# Patient Record
Sex: MALE | Race: WHITE | Employment: UNEMPLOYED | URBAN - NONMETROPOLITAN AREA
[De-identification: names, ages, dates, MRNs, and addresses within clinical notes are randomized per-mention and may not be internally consistent; named-entity substitution may affect disease eponyms.]

---

## 2019-04-02 NOTE — H&P
[address] [place] [address]                       PREOPERATIVE HISTORY AND PHYSICAL    PATIENT NAME: Beatris Telles                       :        1950  MED REC NO:   [id number]                           ROOM:  ACCOUNT NO:   [de-identified]                           ADMIT DATE: 04/10/2019  PROVIDER:     Stacey Hudson P.A-C. Manju Roberts PA-C, dictating for Dr. Rosy Disla. CHIEF COMPLAINT:  Progressive worsening right knee pain. HISTORY OF PRESENT ILLNESS:  The patient is a pleasant 72-year-old male  presents for ongoing history of chronic right knee pain. The patient  has actually been known to have this right knee pain that has been  progressively worsening since the . He has had a right knee  arthroscopy back in  and then noticed increasing pain since then. He  states that the pain is more so in the inside of this knee. He has had  multiple cortical steroid injections, tried nonsteroidal  anti-inflammatories, tried modifying his activities. Unfortunately, he  is still having pain with minimal release from conservative treatment. Due to failure of conservative treatment and degenerative changes noted  within the right knee, he is elected to proceed with right total knee  arthroplasty with a plan to do on 04/10/2019 at [address] [place] [address] by Dr. Manuelito Jaimes. Risks and benefits of infection, stiffness of the  joint, failure of the procedure, failure of the components as well as  risk of anesthesia and blood clots have been discussed. The patient  understands this. The patient will need preoperative clearance. He  will need CBC, BMP, MRSA PCR, chest x-ray, EKG, and urinalysis. At this  stage, the patient would like to proceed understanding these protocols. PAST MEDICAL HISTORY:  Osteoarthritis, hypertension, and  hypercholesterolemia.     CURRENT MEDICATIONS:  Amitriptyline, atorvastatin, lisinopril. ALLERGIES:  No known drug allergies. PAST SURGICAL HISTORY:  Hernia repair, right rotator cuff repair, lumbar  surgery, L2 through S1 laminectomy. SOCIAL HISTORY:  The patient does not smoke. The patient does not  drink. He lives at home with his spouse. REVIEW OF SYSTEMS:  Noncontributory including no fatigue, no nausea,  vomiting, no fevers, chills, shortness of breath, or chest pain. Does  report joint pain, joint swelling, arthralgias, and myalgias. FAMILY HISTORY:  Noncontributory. PHYSICAL EXAMINATION:  GENERAL:  Shows a well-developed, well-nourished white male in no acute  distress. Looks his stated age. Mood and affect appropriate. Alert  and oriented x3. HEENT:  Normocephalic and atraumatic. EOMs intact. No oropharyngitis  noted. HEART:  Regular rate and rhythm. S1, S2 intact. No rubs or gallops  noted. LUNGS:  Clear to auscultation. No audible wheezing. ABDOMEN:  Soft and nontender to palpation. EXTREMITIES:  Right lower extremity:  In regards to the right knee, skin  is intact. No rashes or lesions noted. He does have mild swelling. May be a slight effusion noted today. However, fairly good range of  motion of the right knee 0 to 115 degrees. He does have a grade 1  medial laxity upon varus and valgus stress testing. No anterior laxity  noted. He does have some slight decreased range of motion of the right  hip with external and internal rotation with some mild tenderness. Most  of the time seemed to be localized to the right knee. Tenderness upon  palpation to the medial joint line. No lateral joint line tenderness. He has good range of motion of ankle and toes. Calves are soft and  nontender to palpation. Neurovascularly intact. No numbness or  tingling. He has good dorsalis pedis and posterior tibialis pulses.     X-RAY IMAGING:  Prior x-rays AP and PA both knees standing, lateral, and  sunrise of the left knee shows the patient has significant  osteoarthritis within the medial compartment of right knee, essentially  bone-on-bone arthritis. Does have some dishing in the proximal tibia  noted as well. Mild spurring and decreased joint space in terms of the  patellofemoral joint. Good overall alignment with no subluxation or  dislocation noted. IMPRESSION:  Primary osteoarthritis, right knee, M17.11. PLAN:  At this stage, we are going to proceed with right total knee  arthroplasty to be done on 04/10/2019 at [facility] by  Dr. Nathaly Everett. The patient has then cleared by his primary care provider  to go ahead and proceed with surgery from medical standpoint. He has  had a CBC, BMP, MRSA PCR, chest x-ray, EKG, and urinalysis, all within  normal limits. Negative MRSA PCR screening. Therefore, the patient was  placed on Keflex for antibiotic prophylaxis. We will give Percocet for  pain management. We will most likely place him on aspirin protocol for  DVT prophylaxis. The patient will continue physical therapy on an  outpatient basis. Most likely (going home) after surgery. He will  follow up in the office two weeks after surgery for repeat evaluation,  x-ray imaging, and for wound recheck. At this stage, he understands the  preop and postop protocols, risks and benefits. The patient desired to  proceed with the above-mentioned procedure. Dr. Ole Foster has seen and evaluated the patient, agrees with the  above-noted findings and the above-noted procedure.         Felicitas Almeida P.A-C.    D: 04/01/2019 10:58:49       T: 04/01/2019 21:09:29     [email or web address]  Job#: [id number]     Doc#: [id number]    CC:

## 2019-04-03 NOTE — PROGRESS NOTES
Reminder fax sent to Dr. Mixon Rounds office to send consent, orders, CXR, EKG, LAB results and clearance for surgery on 4/10/19 as soon as you have them. Thank you.

## 2019-04-04 RX ORDER — GABAPENTIN 100 MG/1
100 CAPSULE ORAL 2 TIMES DAILY
COMMUNITY

## 2019-04-04 RX ORDER — ATORVASTATIN CALCIUM 20 MG/1
20 TABLET, FILM COATED ORAL DAILY
COMMUNITY

## 2019-04-04 NOTE — PROGRESS NOTES
NPO after midnight  Bring insurance info and id  Wear comfortable clean clothing  Do not bring jewelry   Shower night before and morning of surgery with a liquid antibacterial soap  Bring medications in original bottles  Follow all instructions give by your physician   needed at discharge  Call [phone] for any questions

## 2019-04-04 NOTE — PROGRESS NOTES
In preparation for their surgical procedure above patient was screened for Obstructive Sleep Apnea (ANDREY) using the STOP-Bang Questionnaire by the Pre-Admission Testing department. This is a pre-surgical screening tool for patient safety and serves as a recommendation, this WILL NOT cause cancellation of surgery. STOP-Bang Questionnaire  * Do you currently see a pulmonologist?  No     If yes STOP, do not complete. Patient follows with  .    1. Do you snore loudly (able to be heard in the next room)? No    2. Do you often feel tired or sleepy during the daytime? No       3. Has anyone ever told you that you stop breathing during your sleep? No    4. Do you have or are you being treated for high blood pressure? Yes      5. BMI more than 35? BMI (Calculated): 27.5        No    6. Age over 48 years? 71 y.o. Yes    7. Neck Circumference greater than 17 inches for male or 16 inches for female? Measured           (visits only)            Not Applicable    8. Gender Male? Yes      TOTAL SCORE: 3    ANDREY - Low Risk : Yes to 0 - 2 questions  ANDREY - Intermediate Risk : Yes to 3 - 4 questions  ANDREY - High Risk : Yes to 5 - 8 questions    Adapted from:   STOP Questionnaire: A Tool to Screen Patients for Obstructive Sleep Apnea   DENNIS FariaC., Redd Craig M.B.B.S., Chiquita Cabello M.D., [facility]. Ronnie Pham, Ph.D., SUE Byrd.B.B.S., Kristin Garcia M.Sc., Isabella Merlos M.D., [facility]. Corinne Botts, F.R.C.P.C.    Anesthesiology 2008; [id number]:[id number] Copyright 2008, the [address] of [employer]est[employer], [facility].   ----------------------------------------------------------------------------------------------------------------

## 2019-04-08 NOTE — PROGRESS NOTES
Left telephone message for Bhaskar Cantu in medical records looking for pre op testing results for surgery wed 4/10/19. Please send what you have,  thank You.

## 2019-04-10 ENCOUNTER — ANESTHESIA EVENT (OUTPATIENT)
Dept: OPERATING ROOM | Age: 69
DRG: 469 | End: 2019-04-10

## 2019-04-10 ENCOUNTER — HOSPITAL ENCOUNTER (INPATIENT)
Age: 69
LOS: 2 days | Discharge: HOME | DRG: 469 | End: 2019-04-12
Attending: ORTHOPAEDIC SURGERY | Admitting: ORTHOPAEDIC SURGERY

## 2019-04-10 ENCOUNTER — ANESTHESIA (OUTPATIENT)
Dept: OPERATING ROOM | Age: 69
DRG: 469 | End: 2019-04-10

## 2019-04-10 ENCOUNTER — APPOINTMENT (OUTPATIENT)
Dept: GENERAL RADIOLOGY | Age: 69
DRG: 469 | End: 2019-04-10
Attending: ORTHOPAEDIC SURGERY

## 2019-04-10 VITALS
SYSTOLIC BLOOD PRESSURE: 98 MMHG | OXYGEN SATURATION: 87 % | RESPIRATION RATE: 1 BRPM | DIASTOLIC BLOOD PRESSURE: 63 MMHG

## 2019-04-10 DIAGNOSIS — M17.11 PRIMARY OSTEOARTHRITIS OF RIGHT KNEE: Primary | ICD-10-CM

## 2019-04-10 LAB
ABO: NORMAL
ANTIBODY SCREEN: NORMAL
RH FACTOR: NORMAL

## 2019-04-10 PROCEDURE — 6370000000 HC RX 637 (ALT 250 FOR IP): Performed by: ANESTHESIOLOGY

## 2019-04-10 PROCEDURE — C1776 JOINT DEVICE (IMPLANTABLE): HCPCS | Performed by: ORTHOPAEDIC SURGERY

## 2019-04-10 PROCEDURE — 3600000015 HC SURGERY LEVEL 5 ADDTL 15MIN: Performed by: ORTHOPAEDIC SURGERY

## 2019-04-10 PROCEDURE — 6360000002 HC RX W HCPCS: Performed by: NURSE ANESTHETIST, CERTIFIED REGISTERED

## 2019-04-10 PROCEDURE — 6360000002 HC RX W HCPCS: Performed by: ORTHOPAEDIC SURGERY

## 2019-04-10 PROCEDURE — 97110 THERAPEUTIC EXERCISES: CPT

## 2019-04-10 PROCEDURE — 6370000000 HC RX 637 (ALT 250 FOR IP): Performed by: ORTHOPAEDIC SURGERY

## 2019-04-10 PROCEDURE — 3600000005 HC SURGERY LEVEL 5 BASE: Performed by: ORTHOPAEDIC SURGERY

## 2019-04-10 PROCEDURE — 2709999900 HC NON-CHARGEABLE SUPPLY: Performed by: ORTHOPAEDIC SURGERY

## 2019-04-10 PROCEDURE — 73560 X-RAY EXAM OF KNEE 1 OR 2: CPT

## 2019-04-10 PROCEDURE — 2580000003 HC RX 258: Performed by: ORTHOPAEDIC SURGERY

## 2019-04-10 PROCEDURE — 86900 BLOOD TYPING SEROLOGIC ABO: CPT

## 2019-04-10 PROCEDURE — 86850 RBC ANTIBODY SCREEN: CPT

## 2019-04-10 PROCEDURE — 2580000003 HC RX 258: Performed by: PHYSICIAN ASSISTANT

## 2019-04-10 PROCEDURE — 3700000001 HC ADD 15 MINUTES (ANESTHESIA): Performed by: ORTHOPAEDIC SURGERY

## 2019-04-10 PROCEDURE — 3700000000 HC ANESTHESIA ATTENDED CARE: Performed by: ORTHOPAEDIC SURGERY

## 2019-04-10 PROCEDURE — 51798 US URINE CAPACITY MEASURE: CPT

## 2019-04-10 PROCEDURE — 0SRC069 REPLACEMENT OF RIGHT KNEE JOINT WITH OXIDIZED ZIRCONIUM ON POLYETHYLENE SYNTHETIC SUBSTITUTE, CEMENTED, OPEN APPROACH: ICD-10-PCS | Performed by: ORTHOPAEDIC SURGERY

## 2019-04-10 PROCEDURE — L1830 KO IMMOB CANVAS LONG PRE OTS: HCPCS | Performed by: ORTHOPAEDIC SURGERY

## 2019-04-10 PROCEDURE — 94640 AIRWAY INHALATION TREATMENT: CPT

## 2019-04-10 PROCEDURE — 86901 BLOOD TYPING SEROLOGIC RH(D): CPT

## 2019-04-10 PROCEDURE — 94761 N-INVAS EAR/PLS OXIMETRY MLT: CPT

## 2019-04-10 PROCEDURE — 7100000000 HC PACU RECOVERY - FIRST 15 MIN: Performed by: ORTHOPAEDIC SURGERY

## 2019-04-10 PROCEDURE — 6370000000 HC RX 637 (ALT 250 FOR IP): Performed by: PHYSICIAN ASSISTANT

## 2019-04-10 PROCEDURE — 94760 N-INVAS EAR/PLS OXIMETRY 1: CPT

## 2019-04-10 PROCEDURE — C1713 ANCHOR/SCREW BN/BN,TIS/BN: HCPCS | Performed by: ORTHOPAEDIC SURGERY

## 2019-04-10 PROCEDURE — 7100000001 HC PACU RECOVERY - ADDTL 15 MIN: Performed by: ORTHOPAEDIC SURGERY

## 2019-04-10 PROCEDURE — 2500000003 HC RX 250 WO HCPCS: Performed by: NURSE ANESTHETIST, CERTIFIED REGISTERED

## 2019-04-10 PROCEDURE — 2500000003 HC RX 250 WO HCPCS: Performed by: ORTHOPAEDIC SURGERY

## 2019-04-10 PROCEDURE — 97162 PT EVAL MOD COMPLEX 30 MIN: CPT

## 2019-04-10 PROCEDURE — 2720000010 HC SURG SUPPLY STERILE: Performed by: ORTHOPAEDIC SURGERY

## 2019-04-10 PROCEDURE — 1200000000 HC SEMI PRIVATE

## 2019-04-10 PROCEDURE — 6360000002 HC RX W HCPCS: Performed by: PHYSICIAN ASSISTANT

## 2019-04-10 PROCEDURE — 36415 COLL VENOUS BLD VENIPUNCTURE: CPT

## 2019-04-10 DEVICE — GENESIS II NON-POROUS TIBIAL                                    BASEPLATE SIZE 6 RIGHT
Type: IMPLANTABLE DEVICE | Site: KNEE | Status: FUNCTIONAL
Brand: GENESIS II

## 2019-04-10 DEVICE — LEGION POSTERIOR STABILIZED HIGH                                    FLEX HIGHLY CROSS LINKED                                    POLYETHYLENE SIZE 5-6 15MM
Type: IMPLANTABLE DEVICE | Site: KNEE | Status: FUNCTIONAL
Brand: LEGION

## 2019-04-10 DEVICE — VERSABOND 40 GRAMS FORMULATION 2
Type: IMPLANTABLE DEVICE | Site: KNEE | Status: FUNCTIONAL
Brand: VERSABOND

## 2019-04-10 DEVICE — GENESIS II BICONVEX PATELLA 32MM
Type: IMPLANTABLE DEVICE | Site: KNEE | Status: FUNCTIONAL
Brand: GENESIS II

## 2019-04-10 DEVICE — GENESIS II OXINIUM POSTERIOR                                    STABILIZED FEMORAL RIGHT SIZE 6
Type: IMPLANTABLE DEVICE | Site: KNEE | Status: FUNCTIONAL
Brand: GENESIS II

## 2019-04-10 RX ORDER — OXYCODONE HYDROCHLORIDE AND ACETAMINOPHEN 5; 325 MG/1; MG/1
2 TABLET ORAL EVERY 4 HOURS PRN
Status: DISCONTINUED | OUTPATIENT
Start: 2019-04-11 | End: 2019-04-12 | Stop reason: HOSPADM

## 2019-04-10 RX ORDER — ONDANSETRON 2 MG/ML
4 INJECTION INTRAMUSCULAR; INTRAVENOUS
Status: DISCONTINUED | OUTPATIENT
Start: 2019-04-10 | End: 2019-04-10 | Stop reason: HOSPADM

## 2019-04-10 RX ORDER — LISINOPRIL 20 MG/1
20 TABLET ORAL DAILY
Status: DISCONTINUED | OUTPATIENT
Start: 2019-04-11 | End: 2019-04-12 | Stop reason: HOSPADM

## 2019-04-10 RX ORDER — DIPHENHYDRAMINE HYDROCHLORIDE 50 MG/ML
12.5 INJECTION INTRAMUSCULAR; INTRAVENOUS
Status: DISCONTINUED | OUTPATIENT
Start: 2019-04-10 | End: 2019-04-10 | Stop reason: HOSPADM

## 2019-04-10 RX ORDER — GABAPENTIN 100 MG/1
100 CAPSULE ORAL 2 TIMES DAILY
Status: DISCONTINUED | OUTPATIENT
Start: 2019-04-10 | End: 2019-04-12 | Stop reason: HOSPADM

## 2019-04-10 RX ORDER — HYDROCODONE BITARTRATE AND ACETAMINOPHEN 5; 325 MG/1; MG/1
2 TABLET ORAL EVERY 4 HOURS PRN
Status: DISPENSED | OUTPATIENT
Start: 2019-04-10 | End: 2019-04-11

## 2019-04-10 RX ORDER — NALOXONE HYDROCHLORIDE 0.4 MG/ML
0.4 INJECTION, SOLUTION INTRAMUSCULAR; INTRAVENOUS; SUBCUTANEOUS PRN
Status: DISCONTINUED | OUTPATIENT
Start: 2019-04-10 | End: 2019-04-10 | Stop reason: HOSPADM

## 2019-04-10 RX ORDER — ACETAMINOPHEN 325 MG/1
325 TABLET ORAL EVERY 4 HOURS PRN
Status: DISCONTINUED | OUTPATIENT
Start: 2019-04-10 | End: 2019-04-10 | Stop reason: HOSPADM

## 2019-04-10 RX ORDER — TRANEXAMIC ACID 100 MG/ML
INJECTION, SOLUTION INTRAVENOUS PRN
Status: DISCONTINUED | OUTPATIENT
Start: 2019-04-10 | End: 2019-04-10 | Stop reason: SDUPTHER

## 2019-04-10 RX ORDER — DIPHENHYDRAMINE HYDROCHLORIDE 50 MG/ML
25 INJECTION INTRAMUSCULAR; INTRAVENOUS EVERY 6 HOURS PRN
Status: DISCONTINUED | OUTPATIENT
Start: 2019-04-10 | End: 2019-04-10 | Stop reason: HOSPADM

## 2019-04-10 RX ORDER — SODIUM CHLORIDE 9 MG/ML
INJECTION, SOLUTION INTRAVENOUS CONTINUOUS
Status: DISCONTINUED | OUTPATIENT
Start: 2019-04-10 | End: 2019-04-11

## 2019-04-10 RX ORDER — DOCUSATE SODIUM 100 MG/1
100 CAPSULE, LIQUID FILLED ORAL 2 TIMES DAILY
Status: DISCONTINUED | OUTPATIENT
Start: 2019-04-10 | End: 2019-04-12 | Stop reason: HOSPADM

## 2019-04-10 RX ORDER — HYDROCODONE BITARTRATE AND ACETAMINOPHEN 5; 325 MG/1; MG/1
1 TABLET ORAL EVERY 4 HOURS PRN
Status: DISCONTINUED | OUTPATIENT
Start: 2019-04-10 | End: 2019-04-10 | Stop reason: HOSPADM

## 2019-04-10 RX ORDER — HYDRALAZINE HYDROCHLORIDE 20 MG/ML
5 INJECTION INTRAMUSCULAR; INTRAVENOUS EVERY 10 MIN PRN
Status: DISCONTINUED | OUTPATIENT
Start: 2019-04-10 | End: 2019-04-10 | Stop reason: HOSPADM

## 2019-04-10 RX ORDER — IPRATROPIUM BROMIDE AND ALBUTEROL SULFATE 2.5; .5 MG/3ML; MG/3ML
1 SOLUTION RESPIRATORY (INHALATION) ONCE
Status: COMPLETED | OUTPATIENT
Start: 2019-04-10 | End: 2019-04-10

## 2019-04-10 RX ORDER — ACETAMINOPHEN 325 MG/1
650 TABLET ORAL EVERY 4 HOURS PRN
Status: DISCONTINUED | OUTPATIENT
Start: 2019-04-10 | End: 2019-04-12 | Stop reason: HOSPADM

## 2019-04-10 RX ORDER — ASPIRIN 325 MG
325 TABLET, DELAYED RELEASE (ENTERIC COATED) ORAL 2 TIMES DAILY
Qty: 56 TABLET | Refills: 0 | Status: SHIPPED | OUTPATIENT
Start: 2019-04-10 | End: 2019-06-18

## 2019-04-10 RX ORDER — PROPOFOL 10 MG/ML
INJECTION, EMULSION INTRAVENOUS CONTINUOUS PRN
Status: DISCONTINUED | OUTPATIENT
Start: 2019-04-10 | End: 2019-04-10 | Stop reason: SDUPTHER

## 2019-04-10 RX ORDER — TOBRAMYCIN 1.2 G/30ML
INJECTION, POWDER, LYOPHILIZED, FOR SOLUTION INTRAVENOUS PRN
Status: DISCONTINUED | OUTPATIENT
Start: 2019-04-10 | End: 2019-04-10 | Stop reason: ALTCHOICE

## 2019-04-10 RX ORDER — OXYCODONE HYDROCHLORIDE AND ACETAMINOPHEN 5; 325 MG/1; MG/1
1 TABLET ORAL EVERY 4 HOURS PRN
Status: DISCONTINUED | OUTPATIENT
Start: 2019-04-10 | End: 2019-04-10 | Stop reason: SDUPTHER

## 2019-04-10 RX ORDER — SODIUM CHLORIDE 0.9 % (FLUSH) 0.9 %
10 SYRINGE (ML) INJECTION PRN
Status: DISCONTINUED | OUTPATIENT
Start: 2019-04-10 | End: 2019-04-12 | Stop reason: HOSPADM

## 2019-04-10 RX ORDER — MORPHINE SULFATE 2 MG/ML
2 INJECTION, SOLUTION INTRAMUSCULAR; INTRAVENOUS
Status: DISCONTINUED | OUTPATIENT
Start: 2019-04-10 | End: 2019-04-12 | Stop reason: HOSPADM

## 2019-04-10 RX ORDER — NICOTINE 21 MG/24HR
1 PATCH, TRANSDERMAL 24 HOURS TRANSDERMAL EVERY 24 HOURS
COMMUNITY
End: 2019-06-18

## 2019-04-10 RX ORDER — ATORVASTATIN CALCIUM 20 MG/1
20 TABLET, FILM COATED ORAL DAILY
Status: DISCONTINUED | OUTPATIENT
Start: 2019-04-10 | End: 2019-04-12 | Stop reason: HOSPADM

## 2019-04-10 RX ORDER — ONDANSETRON 2 MG/ML
4 INJECTION INTRAMUSCULAR; INTRAVENOUS EVERY 6 HOURS PRN
Status: DISCONTINUED | OUTPATIENT
Start: 2019-04-10 | End: 2019-04-12 | Stop reason: HOSPADM

## 2019-04-10 RX ORDER — HYDROCODONE BITARTRATE AND ACETAMINOPHEN 5; 325 MG/1; MG/1
1 TABLET ORAL EVERY 4 HOURS PRN
Status: ACTIVE | OUTPATIENT
Start: 2019-04-10 | End: 2019-04-11

## 2019-04-10 RX ORDER — MEPERIDINE HYDROCHLORIDE 25 MG/ML
12.5 INJECTION INTRAMUSCULAR; INTRAVENOUS; SUBCUTANEOUS EVERY 5 MIN PRN
Status: DISCONTINUED | OUTPATIENT
Start: 2019-04-10 | End: 2019-04-10 | Stop reason: HOSPADM

## 2019-04-10 RX ORDER — MORPHINE SULFATE 4 MG/ML
4 INJECTION, SOLUTION INTRAMUSCULAR; INTRAVENOUS
Status: DISCONTINUED | OUTPATIENT
Start: 2019-04-10 | End: 2019-04-12 | Stop reason: HOSPADM

## 2019-04-10 RX ORDER — OXYCODONE HYDROCHLORIDE AND ACETAMINOPHEN 5; 325 MG/1; MG/1
1 TABLET ORAL EVERY 6 HOURS PRN
Qty: 28 TABLET | Refills: 0 | Status: SHIPPED | OUTPATIENT
Start: 2019-04-10 | End: 2019-04-17

## 2019-04-10 RX ORDER — OXYCODONE HYDROCHLORIDE AND ACETAMINOPHEN 5; 325 MG/1; MG/1
1 TABLET ORAL EVERY 4 HOURS PRN
Status: DISCONTINUED | OUTPATIENT
Start: 2019-04-11 | End: 2019-04-12 | Stop reason: HOSPADM

## 2019-04-10 RX ORDER — OXYCODONE HYDROCHLORIDE AND ACETAMINOPHEN 5; 325 MG/1; MG/1
2 TABLET ORAL EVERY 4 HOURS PRN
Status: DISCONTINUED | OUTPATIENT
Start: 2019-04-10 | End: 2019-04-10 | Stop reason: SDUPTHER

## 2019-04-10 RX ORDER — FENTANYL CITRATE 50 UG/ML
INJECTION, SOLUTION INTRAMUSCULAR; INTRAVENOUS PRN
Status: DISCONTINUED | OUTPATIENT
Start: 2019-04-10 | End: 2019-04-10 | Stop reason: SDUPTHER

## 2019-04-10 RX ORDER — NICOTINE 21 MG/24HR
1 PATCH, TRANSDERMAL 24 HOURS TRANSDERMAL EVERY 24 HOURS
Status: DISCONTINUED | OUTPATIENT
Start: 2019-04-10 | End: 2019-04-12 | Stop reason: HOSPADM

## 2019-04-10 RX ORDER — FENTANYL CITRATE 50 UG/ML
50 INJECTION, SOLUTION INTRAMUSCULAR; INTRAVENOUS EVERY 5 MIN PRN
Status: DISCONTINUED | OUTPATIENT
Start: 2019-04-10 | End: 2019-04-10 | Stop reason: HOSPADM

## 2019-04-10 RX ORDER — LABETALOL HYDROCHLORIDE 5 MG/ML
5 INJECTION, SOLUTION INTRAVENOUS EVERY 5 MIN PRN
Status: DISCONTINUED | OUTPATIENT
Start: 2019-04-10 | End: 2019-04-10 | Stop reason: HOSPADM

## 2019-04-10 RX ORDER — SODIUM CHLORIDE 0.9 % (FLUSH) 0.9 %
10 SYRINGE (ML) INJECTION EVERY 12 HOURS SCHEDULED
Status: DISCONTINUED | OUTPATIENT
Start: 2019-04-10 | End: 2019-04-12 | Stop reason: HOSPADM

## 2019-04-10 RX ORDER — HYDROCODONE BITARTRATE AND ACETAMINOPHEN 5; 325 MG/1; MG/1
2 TABLET ORAL EVERY 4 HOURS PRN
Status: DISCONTINUED | OUTPATIENT
Start: 2019-04-10 | End: 2019-04-10 | Stop reason: HOSPADM

## 2019-04-10 RX ORDER — TRANEXAMIC ACID 100 MG/ML
INJECTION, SOLUTION INTRAVENOUS PRN
Status: DISCONTINUED | OUTPATIENT
Start: 2019-04-10 | End: 2019-04-10 | Stop reason: ALTCHOICE

## 2019-04-10 RX ORDER — MORPHINE SULFATE 2 MG/ML
2 INJECTION, SOLUTION INTRAMUSCULAR; INTRAVENOUS EVERY 5 MIN PRN
Status: DISCONTINUED | OUTPATIENT
Start: 2019-04-10 | End: 2019-04-10 | Stop reason: HOSPADM

## 2019-04-10 RX ORDER — CEPHALEXIN 500 MG/1
500 CAPSULE ORAL 2 TIMES DAILY
Qty: 14 CAPSULE | Refills: 0 | Status: SHIPPED | OUTPATIENT
Start: 2019-04-10 | End: 2019-04-17

## 2019-04-10 RX ORDER — ONDANSETRON 2 MG/ML
4 INJECTION INTRAMUSCULAR; INTRAVENOUS EVERY 6 HOURS PRN
Status: DISCONTINUED | OUTPATIENT
Start: 2019-04-10 | End: 2019-04-10 | Stop reason: HOSPADM

## 2019-04-10 RX ORDER — MIDAZOLAM HYDROCHLORIDE 1 MG/ML
INJECTION INTRAMUSCULAR; INTRAVENOUS PRN
Status: DISCONTINUED | OUTPATIENT
Start: 2019-04-10 | End: 2019-04-10 | Stop reason: SDUPTHER

## 2019-04-10 RX ADMIN — HYDROCODONE BITARTRATE AND ACETAMINOPHEN 2 TABLET: 5; 325 TABLET ORAL at 14:52

## 2019-04-10 RX ADMIN — PHENYLEPHRINE HYDROCHLORIDE 100 MCG: 10 INJECTION INTRAVENOUS at 08:07

## 2019-04-10 RX ADMIN — HYDROCODONE BITARTRATE AND ACETAMINOPHEN 2 TABLET: 5; 325 TABLET ORAL at 23:40

## 2019-04-10 RX ADMIN — PROPOFOL 50 MCG/KG/MIN: 10 INJECTION, EMULSION INTRAVENOUS at 07:54

## 2019-04-10 RX ADMIN — PHENYLEPHRINE HYDROCHLORIDE 200 MCG: 10 INJECTION INTRAVENOUS at 08:52

## 2019-04-10 RX ADMIN — Medication 10 ML: at 21:21

## 2019-04-10 RX ADMIN — DOCUSATE SODIUM 100 MG: 100 CAPSULE, LIQUID FILLED ORAL at 21:19

## 2019-04-10 RX ADMIN — DOCUSATE SODIUM 100 MG: 100 CAPSULE, LIQUID FILLED ORAL at 15:02

## 2019-04-10 RX ADMIN — MORPHINE SULFATE 2 MG: 2 INJECTION, SOLUTION INTRAMUSCULAR; INTRAVENOUS at 16:25

## 2019-04-10 RX ADMIN — SODIUM CHLORIDE: 9 INJECTION, SOLUTION INTRAVENOUS at 06:24

## 2019-04-10 RX ADMIN — SODIUM CHLORIDE: 9 INJECTION, SOLUTION INTRAVENOUS at 22:07

## 2019-04-10 RX ADMIN — PHENYLEPHRINE HYDROCHLORIDE 100 MCG: 10 INJECTION INTRAVENOUS at 09:09

## 2019-04-10 RX ADMIN — PHENYLEPHRINE HYDROCHLORIDE 200 MCG: 10 INJECTION INTRAVENOUS at 08:43

## 2019-04-10 RX ADMIN — IPRATROPIUM BROMIDE AND ALBUTEROL SULFATE 1 AMPULE: .5; 3 SOLUTION RESPIRATORY (INHALATION) at 07:20

## 2019-04-10 RX ADMIN — ATORVASTATIN CALCIUM 20 MG: 20 TABLET, FILM COATED ORAL at 21:19

## 2019-04-10 RX ADMIN — PHENYLEPHRINE HYDROCHLORIDE 100 MCG: 10 INJECTION INTRAVENOUS at 08:12

## 2019-04-10 RX ADMIN — PHENYLEPHRINE HYDROCHLORIDE 200 MCG: 10 INJECTION INTRAVENOUS at 08:33

## 2019-04-10 RX ADMIN — PHENYLEPHRINE HYDROCHLORIDE 200 MCG: 10 INJECTION INTRAVENOUS at 08:23

## 2019-04-10 RX ADMIN — SODIUM CHLORIDE: 9 INJECTION, SOLUTION INTRAVENOUS at 08:26

## 2019-04-10 RX ADMIN — ASPIRIN 325 MG: 325 TABLET, DELAYED RELEASE ORAL at 21:20

## 2019-04-10 RX ADMIN — FENTANYL CITRATE 50 MCG: 50 INJECTION INTRAMUSCULAR; INTRAVENOUS at 07:51

## 2019-04-10 RX ADMIN — Medication 2 G: at 07:50

## 2019-04-10 RX ADMIN — PHENYLEPHRINE HYDROCHLORIDE 100 MCG: 10 INJECTION INTRAVENOUS at 09:38

## 2019-04-10 RX ADMIN — MIDAZOLAM HYDROCHLORIDE 2 MG: 1 INJECTION, SOLUTION INTRAMUSCULAR; INTRAVENOUS at 07:41

## 2019-04-10 RX ADMIN — Medication 2 G: at 23:38

## 2019-04-10 RX ADMIN — HYDROCODONE BITARTRATE AND ACETAMINOPHEN 2 TABLET: 5; 325 TABLET ORAL at 19:08

## 2019-04-10 RX ADMIN — MORPHINE SULFATE 4 MG: 4 INJECTION INTRAVENOUS at 21:26

## 2019-04-10 RX ADMIN — FENTANYL CITRATE 50 MCG: 50 INJECTION INTRAMUSCULAR; INTRAVENOUS at 07:59

## 2019-04-10 RX ADMIN — TRANEXAMIC ACID 1000 MG: 100 INJECTION, SOLUTION INTRAVENOUS at 07:52

## 2019-04-10 RX ADMIN — GABAPENTIN 100 MG: 100 CAPSULE ORAL at 15:02

## 2019-04-10 RX ADMIN — Medication 2 G: at 15:02

## 2019-04-10 RX ADMIN — PHENYLEPHRINE HYDROCHLORIDE 100 MCG: 10 INJECTION INTRAVENOUS at 09:01

## 2019-04-10 RX ADMIN — SODIUM CHLORIDE: 9 INJECTION, SOLUTION INTRAVENOUS at 09:45

## 2019-04-10 RX ADMIN — GABAPENTIN 100 MG: 100 CAPSULE ORAL at 21:19

## 2019-04-10 RX ADMIN — ASPIRIN 325 MG: 325 TABLET, DELAYED RELEASE ORAL at 15:02

## 2019-04-10 ASSESSMENT — PULMONARY FUNCTION TESTS
PIF_VALUE: 1
PIF_VALUE: 0
PIF_VALUE: 1
PIF_VALUE: 0
PIF_VALUE: 1
PIF_VALUE: 1
PIF_VALUE: 0
PIF_VALUE: 1
PIF_VALUE: 0
PIF_VALUE: 1
PIF_VALUE: 0
PIF_VALUE: 1
PIF_VALUE: 0
PIF_VALUE: 1
PIF_VALUE: 0
PIF_VALUE: 1
PIF_VALUE: 0
PIF_VALUE: 1
PIF_VALUE: 0
PIF_VALUE: 1
PIF_VALUE: 0
PIF_VALUE: 1
PIF_VALUE: 0
PIF_VALUE: 1
PIF_VALUE: 1
PIF_VALUE: 0
PIF_VALUE: 1
PIF_VALUE: 0
PIF_VALUE: 1
PIF_VALUE: 0
PIF_VALUE: 1
PIF_VALUE: 0
PIF_VALUE: 1
PIF_VALUE: 0
PIF_VALUE: 1
PIF_VALUE: 0
PIF_VALUE: 1
PIF_VALUE: 0
PIF_VALUE: 0
PIF_VALUE: 1
PIF_VALUE: 0
PIF_VALUE: 1

## 2019-04-10 ASSESSMENT — PAIN SCALES - GENERAL
PAINLEVEL_OUTOF10: 7
PAINLEVEL_OUTOF10: 6
PAINLEVEL_OUTOF10: 8
PAINLEVEL_OUTOF10: 8
PAINLEVEL_OUTOF10: 7

## 2019-04-10 ASSESSMENT — PAIN DESCRIPTION - PAIN TYPE
TYPE: SURGICAL PAIN
TYPE: ACUTE PAIN
TYPE: SURGICAL PAIN

## 2019-04-10 ASSESSMENT — PAIN DESCRIPTION - DESCRIPTORS: DESCRIPTORS: ACHING

## 2019-04-10 ASSESSMENT — PAIN DESCRIPTION - ORIENTATION
ORIENTATION: RIGHT
ORIENTATION: RIGHT;LEFT
ORIENTATION: RIGHT

## 2019-04-10 ASSESSMENT — PAIN SCALES - WONG BAKER: WONGBAKER_NUMERICALRESPONSE: 6

## 2019-04-10 ASSESSMENT — PAIN - FUNCTIONAL ASSESSMENT: PAIN_FUNCTIONAL_ASSESSMENT: 0-10

## 2019-04-10 ASSESSMENT — PAIN DESCRIPTION - LOCATION
LOCATION: FLANK
LOCATION: KNEE
LOCATION: KNEE

## 2019-04-10 NOTE — OP NOTE
ChloraPrep in the usual manner and  scrubbing, prepping, and draping. Isabella Spink was applied to the wound. Anterior incision with a mid vastus exposure was used to expose the  knee. After exposing the knee, the patient had severe arthritis, medial  compartment of the knee, severe dishing medial tibial plateau. Arthritis, patellofemoral joint and lateral compartment in the knee  also. Severe medial and lateral meniscus tears also. A partial medial  and lateral meniscectomy was performed. Retractors were placed medially  and laterally. A distal femoral drill hole was made, and via 6-degree  sword via preop templating, distal femoral cut was made without  difficulty. Next, we sized between a 6 and a 7. Actually it sized just a little bit  higher than a 6, between a 6 and 7, so we upchucked for a 6, and the  anterior, posterior, and Chamfer cuts were made without difficulty. Box  cut for the LPS was then performed. Using the tibial intramedullary  guide, an oscillating saw was used to cut the proximal tibia. Care  maintained not to injure the LCL or MCL, 2 mm medially and 9 mm  laterally, sized for a size #6. A stem version was chosen. We tried  originally a 6 and 13, and ultimately an 15 mm insert with the femoral  component being #6, tibial component being #6, and checked the rotation. After checking the rotation, the stem version was chosen. A #32  All-Poly patellar button was reamed for the patella. All the trials fit  appropriately. The patient had good range of motion of the knee, no  subluxation of the patella. Next, the trials were removed. A bone plug was placed in the distal  aspect of femur. Two-bag mix of methylmethacrylate with gentamicin was  mixed and placed on a dried, surgilaved proximal tibia. The size #6  tibial component was placed in position. Compression was maintained  until cement hardened, excess cement removed prior to hardening.   The  distal femur was surgilaved, dried, and appropriate cement was applied,  and the Oxinium size #6 femoral component was placed in position. Compression was maintained until cement hardened. Excess cement removed  prior to hardening. We did use a 30 mm insert for compression. The  patella had been reamed and surgilaved, dried, appropriate cement was  applied, and a #32 All-Poly patellar button was placed in position. Compression was maintained until cement hardened and excess cement  removed prior to hardening. At this point, all the components were in, we made sure that we removed  the excess cement, made sure the cement is hardened. All the components  were fixed into place. The 30 mm insert was still slightly loose, so 15  mm insert was placed in position and was stable at 0 degrees, 45  degrees, and 90 degrees. This was deemed to be adequate. The patient  had full range of motion of subluxation patella. The mid vastus  exposure was closed with #0 Vicryl. Tranexamic acid was placed in the  joint. Tourniquet was let down, approximately 69 minutes. Next, the  deep subcu was closed with #0 Quill, simple 2-0 Vicryl. Skin was closed  with staples. Dermabond and dry sterile dressing applied. The patient  tolerated the procedure well, transferred to the recovery room in a good  condition. PLAN:  Progressive range of motion of the knee, ice and elevation,  neurovascular checks. Keep the wound clean and dry. See him back in  the office in two weeks for staple removal.  Hopefully, three or four  weeks back to regular activities. Kirsty Mohamud. Becca, TIA, assisted throughout the procedure with positioning,  draping, retraction, wound closure, dressing, and splint application. THERESA Hackett M.D.    D: 04/10/2019 9:48:33       T: 04/10/2019 12:28:37     [email or web address]  Job#: [id number]     Doc#: [id number]    CC:

## 2019-04-10 NOTE — ANESTHESIA POSTPROCEDURE EVALUATION
Department of Anesthesiology  Postprocedure Note    Patient: Kiarra Matos  MRN: [id number]  YOB: 1950  Date of evaluation: 4/10/2019  Time:  10:21 AM     Procedure Summary     Date:  04/10/19 Room / Location:  [address] [facility] [place]    Anesthesia Start:  3878 Anesthesia Stop:  [place]    Procedure:  RIGHT TOTAL KNEE REPLACEMENT (Right Knee) Diagnosis:  (PRIMARY OSTEOARTHRITIS OF RIGHT KNEE)    Surgeon:  Kayy Blas MD Responsible Provider:  Bella Carmona MD    Anesthesia Type:  spinal ASA Status:  2          Anesthesia Type: No value filed. Elvia Phase I: Elvia Score: 9    Elvia Phase II:      Last vitals: Reviewed and per EMR flowsheets. Anesthesia Post Evaluation   [address], Susan. \Hospital Has a New Name and Outlook.\""  POST-ANESTHESIA NOTE       Name:  Kiarra Matos                                         Age:  71 y.o.   MRN:  [id number]      Last Vitals:  /65   Pulse 67   Temp 97.8 °F (36.6 °C) (Temporal)   Resp 11   Ht 5' 6\" (1.676 m)   Wt 166 lb (75.3 kg)   SpO2 95%   BMI 26.79 kg/m²   Patient Vitals for the past 4 hrs:   BP Temp Temp src Pulse Resp SpO2   04/10/19 1010 104/65 -- -- 67 11 95 %   04/10/19 1005 97/65 -- -- 65 13 97 %   04/10/19 1000 (!) 94/58 -- -- 72 15 91 %   04/10/19 0955 -- 97.8 °F (36.6 °C) Temporal -- -- --   04/10/19 0720 -- -- -- -- -- 93 %       Level of Consciousness:  Awake    Respiratory:  Stable    Oxygen Saturation:  Stable    Cardiovascular:  Stable    Hydration:  Adequate    PONV:  Stable    Post-op Pain:  Adequate analgesia    Post-op Assessment:  No apparent anesthetic complications    Additional Follow-Up / Treatment / Comment:  None    Bella Carmona MD  April 10, 2019   10:21 AM

## 2019-04-10 NOTE — ANESTHESIA PRE PROCEDURE
hours.    Coags: No results found for: PROTIME, INR, APTT    HCG (If Applicable): No results found for: PREGTESTUR, PREGSERUM, HCG, HCGQUANT     ABGs: No results found for: PHART, PO2ART, QFP5QSH, BKK1AKG, BEART, F6AGYNGU     Type & Screen (If Applicable):  Lab Results   Component Value Date    LABRH POS 04/10/2019       Anesthesia Evaluation    Airway: Mallampati: II       Mouth opening: > = 3 FB Dental:          Pulmonary:   (+) COPD:                             Cardiovascular:    (+) hypertension:,       ECG reviewed                        Neuro/Psych:               GI/Hepatic/Renal:             Endo/Other:                     Abdominal:           Vascular:                                        Anesthesia Plan      spinal     ASA 2             Anesthetic plan and risks discussed with patient. Plan discussed with CRNA.                   Zhao Glass MD   4/10/2019

## 2019-04-10 NOTE — PROGRESS NOTES
Orientation: Right;Left(and right knee )       Social/Functional History:    Lives With: Spouse  Type of Home: House  Home Layout: One level  Home Access: Stairs to enter with rails  Entrance Stairs - Number of Steps: 3 steps with 1 HR  Home Equipment: Rolling walker          Receives Help From: Family  ADL Assistance: Independent  Homemaking Assistance: Independent  Ambulation Assistance: Independent  Transfer Assistance: Independent          Additional Comments: Pt was not using AD prior to admission      Objective:       RLE AROM: Exceptions  RLE General AROM: 0- 80 degrees seated          LLE AROM : WFL       Strength RLE: Exception  Comment: S. L.R with S, good quad contraction, DF WFL's    Strength LLE: WFL       Balance  Sitting - Static: Good  Sitting - Dynamic: Good  Standing - Static: Fair, +  Standing - Dynamic: Fair    Supine to Sit: Contact guard assistance(HOB 20 degrees, no bedrail)  Scooting: Contact guard assistance    Transfers  Sit to Stand: Minimal Assistance(of 1 from bed, cues on hand placement )  Stand to sit: Contact guard assistance(to chair, cues on hand placement ')       Ambulation 1  Surface: level tile  Device: Rolling Walker  Assistance: Minimal assistance(of 1)  Quality of Gait: decreased stance right LE, small steps, step too technique this date . vc's frequently to stand erect  and look ahead  Distance: 20 feet    Exercises:  Comments: Pt completed 10 reps quad and glut sets and ankle pumps  to improve strength for function       Activity Tolerance:  Activity Tolerance: Patient Tolerated treatment well    Treatment Initiated: see there ex       Assessment: Body structures, Functions, Activity limitations: Decreased functional mobility , Decreased endurance, Decreased ROM, Decreased strength, Decreased balance  Assessment: Pt with decreased ROM and strength in right LE due to TKR sx today. Pt needs continued therapy to improve strength and ROM and return pt to safe ambualtion in the home  Prognosis: Good    Clinical Presentation: Moderate - Evolving with Changing Characteristics: Pt with decreased ROM and strength in right LE due to TKR sx today. Pt needs continued therapy to improve strength and ROM and return pt to safe ambualtion in the home    Decision Making:  Moderate Complexitybased on patient assessment and decision making process of determining plan of care and establishing reasonable expectations for measurable functional outcomes    REQUIRES PT FOLLOW UP: Yes    Discharge Recommendations:  Discharge Recommendations: Patient would benefit from continued therapy after discharge, Outpatient PT, 24 hour supervision or assist    Patient Education:  Patient Education: POC, ther ex     Equipment Recommendations:  Equipment Needed: No    Safety:  Type of devices: Left in chair, Call light within reach, Gait belt, Nurse notified, All fall risk precautions in place, Chair alarm in place  Restraints  Initially in place: No    Plan:  Times per week: BID 7 X O  Times per day: Twice a day  Specific instructions for Next Treatment: TKR protocol, mobility, gait, steps, car transfer  Current Treatment Recommendations: Strengthening, Gait Training, ROM, Balance Training, Functional Mobility Training, Transfer Training, Home Exercise Program, Stair training, Endurance Training    Goals:  Patient goals : Go home with family  Short term goals  Time Frame for Short term goals: 1 week  Short term goal 1: supine to sit and return with Mod I to get in and out of bed   Short term goal 2: sit to stand with Mod I to get on and off various surfaces  Short term goal 3: ambulate with RW 50 feet or more and SBA to walk safely in the home  Short term goal 4: ascend/descend 3 steps with HR with CGA to enter home  Short term goal 5: car transfer with min A to leave hospital for home  Long term goals  Time Frame for Long term goals : NA due to short ELOS    Evaluation Complexity: Based on the findings of patient history, examination, clinical presentation, and decision making during this evaluation, the evaluation of Latrice Dorado  is of medium complexity.             AM-PAC Inpatient Mobility without Stair Climbing Raw Score : 15  AM-PAC Inpatient without Stair Climbing T-Scale Score : 43.03  Mobility Inpatient CMS 0-100% Score: 47.43  Mobility Inpatient without Stair CMS G-Code Modifier : CK

## 2019-04-10 NOTE — PROGRESS NOTES
7292- Pt to PACU, Wali Briscoe CRNA at bedside for report, pt had right total knee, spinal with duramorph injected, pt level to about lower abdomen. Pt states cant feel leg or move it pt educated on spinal, ace wrap dressing to knee dry and intact. 36- Dr Unique Nunez at bedside discussed block and duramorph in spinal, will place post op order set. And assess pain as spinal wears off.   1022- Pt states doing well no pain, still numb. 1036- Spinal assessed, thigh on left and hip/pelivs on right  46- Dr Nilton Mendez and updated ok with no block, spinal wearing down no pain, duramorph in spinal  1044- Pt given sip of water. 1046- Report called to [facility] RN on 7K, updated on no block, spinal with Duramorph and orders per anesthesia. 1100- Pt meets discharge criteria. Pt doing well, no pain, VSS, pt breathing deeply on 02. Pt transported to floor via transport team on 02.

## 2019-04-10 NOTE — ANESTHESIA PROCEDURE NOTES
Spinal Block    Patient location during procedure: OR  Start time: 4/10/2019 7:40 AM  End time: 4/10/2019 7:48 AM  Reason for block: primary anesthetic  Staffing  Anesthesiologist: Shira Drake MD  Resident/CRNA: JUSTINE Veras - CRNA  Performed: resident/CRNA   Preanesthetic Checklist  Completed: patient identified, site marked, surgical consent, pre-op evaluation, timeout performed, IV checked, risks and benefits discussed, monitors and equipment checked, anesthesia consent given, oxygen available and patient being monitored  Spinal Block  Patient position: sitting  Prep: ChloraPrep  Patient monitoring: continuous pulse ox  Approach: midline  Location: L2/L3  Provider prep: mask and sterile gloves  Local infiltration: lidocaine  Dose: 0.4  Agent: bupivacaine  Adjuvant: duramorph  Dose: 1.8  Dose: 1.8  Needle  Needle type: Pencan   Needle gauge: 25 G  Needle length: 3.5 in  Assessment  Swirl obtained: Yes  CSF: clear  Attempts: 1  Hemodynamics: stable

## 2019-04-10 NOTE — DISCHARGE SUMMARY
Orthopaedic Discharge Summary     Patient ID:  Errol Yu  [id number]  7 y.o.  1950    Admit date: 4/10/2019    Anticipated Discharge date and time: 4/11/2019    Admitting Physician: Tiffany Mullen MD     Discharge Physician: Estee Messina    Admission Diagnoses: Primary osteoarthritis of right knee [M17.11]    Discharge Diagnoses: Same    Admission Condition: stable    Discharged Condition: stable    Indication for Admission: Patient is a 71year old male with known history of OA of the right knee. Has tried conservative management including NSAIDs, corticosteroid injections, exercise therapy, and modifications of his activities of daily living. Unfortunately has failed conservative management and elected for total knee arthroplasty. Patient was medically optimized by his PCP to proceed with surgery. Patient underwent right total knee arthroplasty at [facility] on 4/29/6149 without complications. Has done well post-operatively and ready for discharge. Surgical procedure: Right Total Knee Arthroplasty     Consults: none      Disposition: home    Patient Instructions:   Current Discharge Medication List      START taking these medications    Details   aspirin 325 MG EC tablet Take 1 tablet by mouth 2 times daily for 2 weeks. Then take 1 tablet by mouth once daily for an additional 4 weeks. Qty: 56 tablet, Refills: 0      oxyCODONE-acetaminophen (PERCOCET) 5-325 MG per tablet Take 1 tablet by mouth every 6 hours as needed for Pain for up to 7 days. Intended supply: 7 days.  Take lowest dose possible to manage pain  Qty: 28 tablet, Refills: 0    Comments: Reduce doses taken as pain becomes manageable  Associated Diagnoses: Primary osteoarthritis of right knee      cephALEXin (KEFLEX) 500 MG capsule Take 1 capsule by mouth 2 times daily for 7 days  Qty: 14 capsule, Refills: 0         CONTINUE these medications which have NOT CHANGED    Details   nicotine (NICODERM CQ) 14 MG/24HR Place 1 patch onto the skin every 24

## 2019-04-10 NOTE — INTERVAL H&P NOTE
History and Physical Update    Pt Name: Sarai Burdick  MRN: [id number]  YOB: 1950  Date of evaluation: 4/10/2019    [x] I have examined the patient and reviewed the H&P/Consult and there are no changes to the patient or plans.     [] I have examined the patient and reviewed the H&P/Consult and have noted the following changes:        Sarah Cesar PA-C  Electronically signed 4/10/2019 at 7:49 AM

## 2019-04-11 ENCOUNTER — APPOINTMENT (OUTPATIENT)
Dept: GENERAL RADIOLOGY | Age: 69
DRG: 469 | End: 2019-04-11
Attending: ORTHOPAEDIC SURGERY

## 2019-04-11 LAB
ANION GAP SERPL CALCULATED.3IONS-SCNC: 10 MEQ/L (ref 8–16)
BACTERIA: ABNORMAL /HPF
BASOPHILS # BLD: 0.1 %
BASOPHILS ABSOLUTE: 0 THOU/MM3 (ref 0–0.1)
BILIRUBIN URINE: NEGATIVE
BLOOD, URINE: ABNORMAL
BUN BLDV-MCNC: 17 MG/DL (ref 7–22)
CALCIUM SERPL-MCNC: 7.9 MG/DL (ref 8.5–10.5)
CASTS 2: ABNORMAL /LPF
CASTS UA: ABNORMAL /LPF
CHARACTER, URINE: CLEAR
CHLORIDE BLD-SCNC: 107 MEQ/L (ref 98–111)
CO2: 19 MEQ/L (ref 23–33)
COLOR: YELLOW
CREAT SERPL-MCNC: 1 MG/DL (ref 0.4–1.2)
CRYSTALS, UA: ABNORMAL
EOSINOPHIL # BLD: 0.9 %
EOSINOPHILS ABSOLUTE: 0.1 THOU/MM3 (ref 0–0.4)
EPITHELIAL CELLS, UA: ABNORMAL /HPF
ERYTHROCYTE [DISTWIDTH] IN BLOOD BY AUTOMATED COUNT: 13.1 % (ref 11.5–14.5)
ERYTHROCYTE [DISTWIDTH] IN BLOOD BY AUTOMATED COUNT: 44.9 FL (ref 35–45)
GFR SERPL CREATININE-BSD FRML MDRD: 74 ML/MIN/1.73M2
GLUCOSE BLD-MCNC: 117 MG/DL (ref 70–108)
GLUCOSE URINE: 100 MG/DL
HCT VFR BLD CALC: 37.2 % (ref 42–52)
HEMOGLOBIN: 12.8 GM/DL (ref 14–18)
IMMATURE GRANS (ABS): 0.03 THOU/MM3 (ref 0–0.07)
IMMATURE GRANULOCYTES: 0.3 %
KETONES, URINE: NEGATIVE
LEUKOCYTE ESTERASE, URINE: ABNORMAL
LYMPHOCYTES # BLD: 13.2 %
LYMPHOCYTES ABSOLUTE: 1.2 THOU/MM3 (ref 1–4.8)
MCH RBC QN AUTO: 32.2 PG (ref 26–33)
MCHC RBC AUTO-ENTMCNC: 34.4 GM/DL (ref 32.2–35.5)
MCV RBC AUTO: 93.7 FL (ref 80–94)
MISCELLANEOUS 2: ABNORMAL
MONOCYTES # BLD: 11.1 %
MONOCYTES ABSOLUTE: 1 THOU/MM3 (ref 0.4–1.3)
NITRITE, URINE: NEGATIVE
NUCLEATED RED BLOOD CELLS: 0 /100 WBC
PH UA: 5 (ref 5–9)
PLATELET # BLD: 124 THOU/MM3 (ref 130–400)
PMV BLD AUTO: 9.8 FL (ref 9.4–12.4)
POTASSIUM SERPL-SCNC: 4.5 MEQ/L (ref 3.5–5.2)
PROTEIN UA: NEGATIVE
RBC # BLD: 3.97 MILL/MM3 (ref 4.7–6.1)
RBC URINE: ABNORMAL /HPF
RENAL EPITHELIAL, UA: ABNORMAL
SEG NEUTROPHILS: 74.4 %
SEGMENTED NEUTROPHILS ABSOLUTE COUNT: 6.5 THOU/MM3 (ref 1.8–7.7)
SODIUM BLD-SCNC: 136 MEQ/L (ref 135–145)
SPECIFIC GRAVITY, URINE: 1.01 (ref 1–1.03)
UROBILINOGEN, URINE: 0.2 EU/DL (ref 0–1)
WBC # BLD: 8.8 THOU/MM3 (ref 4.8–10.8)
WBC UA: ABNORMAL /HPF
YEAST: ABNORMAL

## 2019-04-11 PROCEDURE — 94640 AIRWAY INHALATION TREATMENT: CPT

## 2019-04-11 PROCEDURE — 81001 URINALYSIS AUTO W/SCOPE: CPT

## 2019-04-11 PROCEDURE — 51798 US URINE CAPACITY MEASURE: CPT

## 2019-04-11 PROCEDURE — 6370000000 HC RX 637 (ALT 250 FOR IP): Performed by: NURSE PRACTITIONER

## 2019-04-11 PROCEDURE — 97535 SELF CARE MNGMENT TRAINING: CPT

## 2019-04-11 PROCEDURE — 36415 COLL VENOUS BLD VENIPUNCTURE: CPT

## 2019-04-11 PROCEDURE — 97530 THERAPEUTIC ACTIVITIES: CPT

## 2019-04-11 PROCEDURE — 1200000000 HC SEMI PRIVATE

## 2019-04-11 PROCEDURE — 97166 OT EVAL MOD COMPLEX 45 MIN: CPT

## 2019-04-11 PROCEDURE — 6370000000 HC RX 637 (ALT 250 FOR IP): Performed by: PHYSICIAN ASSISTANT

## 2019-04-11 PROCEDURE — 71046 X-RAY EXAM CHEST 2 VIEWS: CPT

## 2019-04-11 PROCEDURE — 6370000000 HC RX 637 (ALT 250 FOR IP): Performed by: ORTHOPAEDIC SURGERY

## 2019-04-11 PROCEDURE — 85025 COMPLETE CBC W/AUTO DIFF WBC: CPT

## 2019-04-11 PROCEDURE — 97110 THERAPEUTIC EXERCISES: CPT

## 2019-04-11 PROCEDURE — 94761 N-INVAS EAR/PLS OXIMETRY MLT: CPT

## 2019-04-11 PROCEDURE — 99253 IP/OBS CNSLTJ NEW/EST LOW 45: CPT | Performed by: NURSE PRACTITIONER

## 2019-04-11 PROCEDURE — 6370000000 HC RX 637 (ALT 250 FOR IP): Performed by: FAMILY MEDICINE

## 2019-04-11 PROCEDURE — 97116 GAIT TRAINING THERAPY: CPT

## 2019-04-11 PROCEDURE — 2700000000 HC OXYGEN THERAPY PER DAY

## 2019-04-11 PROCEDURE — 99254 IP/OBS CNSLTJ NEW/EST MOD 60: CPT | Performed by: FAMILY MEDICINE

## 2019-04-11 PROCEDURE — 80048 BASIC METABOLIC PNL TOTAL CA: CPT

## 2019-04-11 PROCEDURE — 2580000003 HC RX 258: Performed by: PHYSICIAN ASSISTANT

## 2019-04-11 RX ORDER — SODIUM PHOSPHATE, DIBASIC AND SODIUM PHOSPHATE, MONOBASIC 7; 19 G/133ML; G/133ML
1 ENEMA RECTAL
Status: ACTIVE | OUTPATIENT
Start: 2019-04-11 | End: 2019-04-11

## 2019-04-11 RX ORDER — TAMSULOSIN HYDROCHLORIDE 0.4 MG/1
0.4 CAPSULE ORAL NIGHTLY
Qty: 30 CAPSULE | Refills: 3 | Status: SHIPPED | OUTPATIENT
Start: 2019-04-11 | End: 2019-06-18 | Stop reason: SDUPTHER

## 2019-04-11 RX ORDER — IPRATROPIUM BROMIDE AND ALBUTEROL SULFATE 2.5; .5 MG/3ML; MG/3ML
1 SOLUTION RESPIRATORY (INHALATION)
Status: DISCONTINUED | OUTPATIENT
Start: 2019-04-11 | End: 2019-04-12 | Stop reason: HOSPADM

## 2019-04-11 RX ORDER — TAMSULOSIN HYDROCHLORIDE 0.4 MG/1
0.4 CAPSULE ORAL NIGHTLY
Status: DISCONTINUED | OUTPATIENT
Start: 2019-04-11 | End: 2019-04-12 | Stop reason: HOSPADM

## 2019-04-11 RX ADMIN — ASPIRIN 325 MG: 325 TABLET, DELAYED RELEASE ORAL at 20:36

## 2019-04-11 RX ADMIN — ATORVASTATIN CALCIUM 20 MG: 20 TABLET, FILM COATED ORAL at 20:36

## 2019-04-11 RX ADMIN — DOCUSATE SODIUM 100 MG: 100 CAPSULE, LIQUID FILLED ORAL at 08:42

## 2019-04-11 RX ADMIN — IPRATROPIUM BROMIDE AND ALBUTEROL SULFATE 1 AMPULE: .5; 3 SOLUTION RESPIRATORY (INHALATION) at 21:02

## 2019-04-11 RX ADMIN — GABAPENTIN 100 MG: 100 CAPSULE ORAL at 20:36

## 2019-04-11 RX ADMIN — HYDROCODONE BITARTRATE AND ACETAMINOPHEN 2 TABLET: 5; 325 TABLET ORAL at 04:10

## 2019-04-11 RX ADMIN — OXYCODONE AND ACETAMINOPHEN 2 TABLET: 5; 325 TABLET ORAL at 16:52

## 2019-04-11 RX ADMIN — OXYCODONE AND ACETAMINOPHEN 2 TABLET: 5; 325 TABLET ORAL at 21:10

## 2019-04-11 RX ADMIN — LISINOPRIL 20 MG: 20 TABLET ORAL at 08:42

## 2019-04-11 RX ADMIN — OXYCODONE AND ACETAMINOPHEN 2 TABLET: 5; 325 TABLET ORAL at 12:37

## 2019-04-11 RX ADMIN — ASPIRIN 325 MG: 325 TABLET, DELAYED RELEASE ORAL at 08:42

## 2019-04-11 RX ADMIN — GABAPENTIN 100 MG: 100 CAPSULE ORAL at 08:42

## 2019-04-11 RX ADMIN — DOCUSATE SODIUM 100 MG: 100 CAPSULE, LIQUID FILLED ORAL at 20:36

## 2019-04-11 RX ADMIN — TAMSULOSIN HYDROCHLORIDE 0.4 MG: 0.4 CAPSULE ORAL at 20:38

## 2019-04-11 RX ADMIN — Medication 10 ML: at 20:39

## 2019-04-11 RX ADMIN — HYDROCODONE BITARTRATE AND ACETAMINOPHEN 2 TABLET: 5; 325 TABLET ORAL at 08:31

## 2019-04-11 ASSESSMENT — PAIN SCALES - GENERAL
PAINLEVEL_OUTOF10: 6
PAINLEVEL_OUTOF10: 3
PAINLEVEL_OUTOF10: 8
PAINLEVEL_OUTOF10: 4
PAINLEVEL_OUTOF10: 7
PAINLEVEL_OUTOF10: 6
PAINLEVEL_OUTOF10: 4
PAINLEVEL_OUTOF10: 9
PAINLEVEL_OUTOF10: 7
PAINLEVEL_OUTOF10: 8
PAINLEVEL_OUTOF10: 7

## 2019-04-11 ASSESSMENT — PAIN DESCRIPTION - PAIN TYPE
TYPE: SURGICAL PAIN

## 2019-04-11 ASSESSMENT — PAIN DESCRIPTION - LOCATION
LOCATION: KNEE

## 2019-04-11 ASSESSMENT — PAIN DESCRIPTION - ORIENTATION
ORIENTATION: RIGHT

## 2019-04-11 NOTE — CONSULTS
oxyCODONE-acetaminophen (PERCOCET) 5-325 MG per tablet Take 1 tablet by mouth every 6 hours as needed for Pain for up to 7 days. Intended supply: 7 days. Take lowest dose possible to manage pain 4/10/19 4/17/19 Yes TONY Sullivan   cephALEXin (KEFLEX) 500 MG capsule Take 1 capsule by mouth 2 times daily for 7 days 4/10/19 4/17/19 Yes TONY Sullivan   gabapentin (NEURONTIN) 100 MG capsule Take 100 mg by mouth 2 times daily. Yes Historical Provider, MD   atorvastatin (LIPITOR) 20 MG tablet Take 20 mg by mouth daily   Yes Historical Provider, MD   lisinopril (PRINIVIL;ZESTRIL) 20 MG tablet Take 20 mg by mouth daily   Yes Historical Provider, MD       Allergies:  Patient has no known allergies. Social History:      The patient currently lives with wife    TOBACCO:   reports that he has been smoking cigars. He has been smoking about 0.10 packs per day. He has quit using smokeless tobacco.  ETOH:   reports that he does not drink alcohol. Family History:     Reviewed in detail and negative for Positive as follows:        Problem Relation Age of Onset    Heart Disease Father         CABG triple    Cancer Sister     Early Death Neg Hx     High Blood Pressure Neg Hx     Stroke Neg Hx        Diet:  DIET GENERAL;    REVIEW OF SYSTEMS:   Pertinent positives as noted in the HPI. All other systems reviewed and negative. PHYSICAL EXAM:  /71   Pulse 87   Temp 97.2 °F (36.2 °C) (Oral)   Resp 16   Ht 5' 6\" (1.676 m)   Wt 166 lb (75.3 kg)   SpO2 100%   BMI 26.79 kg/m²   General appearance: No apparent distress, appears stated age and cooperative. Up in [facility]. HEENT: Normal cephalic, atraumatic without obvious deformity. Pupils equal, round, and reactive to light. Extra ocular muscles intact. Conjunctivae/corneas clear. Neck: Supple, with full range of motion. No jugular venous distention. Trachea midline. Respiratory:  Normal respiratory effort. Exp harsh wheeze, no rales or rhonchi.   No distress. Cardiovascular: Regular rate and rhythm with normal S1/S2 without murmurs, rubs or gallops. Abdomen: Soft, non-tender, non-distended with normal bowel sounds. Musculoskeletal: No clubbing, cyanosis or calf edema bilaterally. Left knee with ACE wrap in place. No calf TTP. Skin: Skin color, texture, turgor normal.  No rashes or lesions. Neurologic:  Neurovascularly intact without any focal sensory/motor deficits. Cranial nerves: II-XII intact, grossly non-focal.  Psychiatric: Alert and oriented, thought content appropriate, normal insight  Capillary Refill: Brisk,< 3 seconds   Peripheral Pulses: +2 palpable, equal bilaterally     Labs:     Recent Labs     04/11/19  0626   WBC 8.8   HGB 12.8*   HCT 37.2*   *     Recent Labs     04/11/19  0626      K 4.5      CO2 19*   BUN 17   CREATININE 1.0   CALCIUM 7.9*     No results for input(s): AST, ALT, BILIDIR, BILITOT, ALKPHOS in the last 72 hours. No results for input(s): INR in the last 72 hours. No results for input(s): Marielle Maha in the last 72 hours. Urinalysis:  No results found for: Barb Loera, BACTERIA, RBCUA, BLOODU, [address], [address]    Radiology: I have reviewed the radiology reports with the following interpretation:     XR KNEE RIGHT (1-2 VIEWS)   Final Result   1. There is a cemented total right knee arthroplasty with intact femoral, tibial and patellar implants. The silastic spacer is also intact. There is no periimplant fracture or periimplant lucency. There is no malalignment. There is an intra-articular    air-fluid level and soft tissue gas consistent with the antecedent surgery. There are also cutaneous surgical staples along the anterior aspect of the knee. **This report has been created using voice recognition software. It may contain minor errors which are inherent in voice recognition technology. **      Final report electronically signed by Dr. Sonja Funes on 4/10/2019 10:12 AM      XR CHEST STANDARD (2 VW)    (Results Pending)          EKG:  none    DVT prophylaxis: SCD's    Code Status: Full Code    PT/OT Eval Status: Active and ongoing      ASSESSMENT/PLAN:    1. Acute post-op respiratory insufficiency -- 4/10 pm down to 85% on RA -- likely due to narcotics, COPD and recent cold -- check CXR, add duonebs and wean O2 -- unlikely PE but if unable to wean consider CTA. Encouraged IS -- ?need cardiac eval but no c/o CP. 2. Urinary retention -- urology consulted per primary - handy in place. 3. COPD -- ?exacerbation with surgery -- add duonebs 4/11, check CXR 4/11 -- if no improvement and wheezing then will add steroids  4. OA s/p right TKR -- per primary  5. Metabolic acidosis -- ?due to surgery -- monitor - no fever or signs of infection - cont monitor  6. Essential HTN -- cont lisinopril  7. Chronic normocytic anemia -- stable at 12.8 -- was 10's in 2015 -- asx - monitor   8. Chronic lumbar back pain with hx neurogenic claudication  9. HLD -- cont statin  10. Tobacco abuse -- counseled on cessation - denies need for patch     Dispo -- check CXR, add duonebs, limit narcotics and wean O2 as able. If no improvement consider r/o PE and cardiac w/u. Hospitalists will follow        Thank you for the consultation.     Electronically signed by Winter Hernandez MD on 4/11/2019 at 10:24 AM

## 2019-04-11 NOTE — PLAN OF CARE
Problem: Pain:  Goal: Pain level will decrease  Description  Pain level will decrease  Outcome: Ongoing  Note:   Pt report pain at 8/10 on scale. Pt states oral/iv medication and rest are helping to achieve pain goal of a 4/10 on scale. Problem: Musculor/Skeletal Functional Status  Goal: Highest potential functional level  Outcome: Ongoing  Note:   Pt has not been out of bed this shift. Pt will work with PT/OT in the morning. Problem: Cardiovascular  Goal: No DVT, peripheral vascular complications  Outcome: Ongoing  Note:   Denies chest pain and calf tenderness. No s/s DVTs. VS stable. Problem: Respiratory  Goal: No pulmonary complications  Outcome: Ongoing  Note:   Pt on continues pulse oximeter. Pt Sp O2 continuously down at 85 %. Pt now on 2 L nasal canula. SpO2 at 90 % . Problem: GI  Goal: No bowel complications  Outcome: Ongoing  Note:   BS active no passing gas. No bowel complication noted. Problem: Nutrition  Goal: Optimal nutrition therapy  Outcome: Ongoing  Note:   Pt general diet. Problem: Skin Integrity/Risk  Goal: No skin breakdown during hospitalization  Outcome: Ongoing  Note:    No new skin impairments noted. Surgical incision dressing dry and intact. Problem: Daily Care:  Goal: Daily care needs are met  Description  Daily care needs are met  Outcome: Ongoing  Note:   Pt dependent with daily care. Pt able to use call light to request assistance. Problem: Discharge Planning:  Goal: Patients continuum of care needs are met  Description  Patients continuum of care needs are met  Outcome: Ongoing  Note:   Pt plans home at discharge. Care manager and social working helping with discharge needs. Care plan reviewed with patient. Patient verbalizes understanding of the plan of care and contribute to goal setting.

## 2019-04-11 NOTE — PROCEDURES
A Bladder scan was performed at 0737 . Tiffanie Liu The residual amount was measured to be 756 ML. Report of results was given to [facility].

## 2019-04-11 NOTE — PROGRESS NOTES
Department of Orthopedic Surgery   Progress Note      SUBJECTIVE  Moderate right knee pain    OBJECTIVE    Physical    VITALS:  /70   Pulse 83   Temp 98.2 °F (36.8 °C) (Oral)   Resp 18   Ht 5' 6\" (1.676 m)   Wt 166 lb (75.3 kg)   SpO2 92%   BMI 26.79 kg/m²   24HR INTAKE/OUTPUT:      Intake/Output Summary (Last 24 hours) at 4/11/2019 0710  Last data filed at 4/11/2019 0649  Gross per 24 hour   Intake 5214.11 ml   Output 2200 ml   Net 3014.11 ml     URINARY CATHETER OUTPUT (Amaral):  Urethral Catheter 16 fr-Output (mL): 500 mL  DRAIN/TUBE OUTPUT:     MUSCULOSKELETAL:  motor strength is 5 out of 5 all extremities bilaterally  Dsg dry and intact   ROM 0 to 80 degrees   N/V intact right leg   Mild swelling right knee     Data    CBC with Differential:    Lab Results   Component Value Date    WBC 8.8 04/11/2019    RBC 3.97 04/11/2019    HGB 12.8 04/11/2019    HCT 37.2 04/11/2019     04/11/2019    MCV 93.7 04/11/2019    MCH 32.2 04/11/2019    MCHC 34.4 04/11/2019    RDW 15.7 06/20/2015    NRBC 0 04/11/2019    SEGSPCT 74.4 04/11/2019    MONOPCT 11.1 04/11/2019    MONOSABS 1.0 04/11/2019    LYMPHSABS 1.2 04/11/2019    EOSABS 0.1 04/11/2019    BASOSABS 0.0 04/11/2019    DIFFTYPE see below 06/18/2015     BMP:    Lab Results   Component Value Date     04/11/2019    K 4.5 04/11/2019     04/11/2019    CO2 19 04/11/2019    BUN 17 04/11/2019    LABALBU 4.0 06/05/2015    CREATININE 1.0 04/11/2019    CALCIUM 7.9 04/11/2019    LABGLOM 74 04/11/2019    GLUCOSE 117 04/11/2019     Current Inpatient Medications        ASSESSMENT AND PLAN      1. Urology and hospitalist consult ordered  2.  Hopefully discharge to home in am    Rogerio Matamoros MD

## 2019-04-11 NOTE — CONSULTS
Urology Consult Note      Reason for Consult:  Urinary retention  Requesting Physician:  Dr. Kelsea Tiwari    History Obtained From:  patient    Chief Complaint: Severe osteoarthritis, right knee    HISTORY OF PRESENT ILLNESS:                The patient is a 71 y.o. male who underwent right total knee replacement by Dr. Kelsea Tiwari 4/10/19. Pt was unable to void following surgery. Bladder scan 4/10/19 1937 756 mls. Pt was straight cath'd. He continued to be unable to void and repeat bladder scan this am at 0431 noted PVR of >822 mls. Amaral was placed and urology consultation was requested. Pt reports hx of \"overactive bladder\" all of his life awakening to urinate even as a young man. Pt reports at baseline his stream is \"strong\" and he denies any feeling of incomplete emptying. Pt reports nocturia of 3 x per night. Pt denies dysuria. Notes urinary frequency and urgency over the course of the day. No abdominal pain. He does not take any urologic medications at baseline. Denies hx of urologic surgery. Denies hx of kidney stones. Patient has not had a bowel movement following surgery. Denies gross hematuria recently. Notes hx of gross hematuria in the remote past that resolved with antibiotic. Pt notes they \"saw blood\" on his preoperative urinalysis. Pt is a current everyday smoker using 1/2 ppd of cigarettes. He started smoking as a teenager. Past Medical History:        Diagnosis Date    Arthritis     COPD (chronic obstructive pulmonary disease) ([address]     Hyperlipidemia     Hypertension     Numbness and tingling     hands and feet    Spinal stenosis of lumbar region with neurogenic claudication      Past Surgical History:        Procedure Laterality Date    CERVICAL SPINE SURGERY  2007    [place].  [address]    KNEE ARTHROSCOPY Right 1999    LUMBAR LAMINECTOMY  06/17/2015    L2-S1 Lumbar Laminectomy    ROTATOR CUFF REPAIR Bilateral 2006    TOTAL KNEE ARTHROPLASTY Right 4/10/2019    RIGHT TOTAL KNEE REPLACEMENT performed by Aishwarya Cohen MD at [address]:  Patient has no known allergies. Social History     Socioeconomic History    Marital status:      Spouse name: Not on file    Number of children: Not on file    Years of education: Not on file    Highest education level: Not on file   Occupational History    Not on file   Social Needs    Financial resource strain: Not on file    Food insecurity:     Worry: Not on file     Inability: Not on file    Transportation needs:     Medical: Not on file     Non-medical: Not on file   Tobacco Use    Smoking status: Light Tobacco Smoker     Packs/day: 0.10     Types: Cigars    Smokeless tobacco: Former User   Substance and Sexual Activity    Alcohol use: No    Drug use: No    Sexual activity: Not on file   Lifestyle    Physical activity:     Days per week: Not on file     Minutes per session: Not on file    Stress: Not on file   Relationships    Social connections:     Talks on phone: Not on file     Gets together: Not on file     Attends Church service: Not on file     Active member of club or organization: Not on file     Attends meetings of clubs or organizations: Not on file     Relationship status: Not on file    Intimate partner violence:     Fear of current or ex partner: Not on file     Emotionally abused: Not on file     Physically abused: Not on file     Forced sexual activity: Not on file   Other Topics Concern    Not on file   Social History Narrative    Not on file       Family History:       Problem Relation Age of Onset    Heart Disease Father         CABG triple    Cancer Sister     Early Death Neg Hx     High Blood Pressure Neg Hx     Stroke Neg Hx        ROS:  Constitutional: Negative for chills, fatigue, fever, or weight loss. Eyes: Denies reported visual changes. ENT: Denies headache, difficulty swallowing, earache, and nosebleeds.   Cardiovascular: Negative for chest pain, palpitations, tachycardia or edema. Respiratory: Denies cough or SOB. GI:The patient denies abdominal or flank pain, anorexia, nausea or vomiting. : see HPI. Musculoskeletal: Patient denies low back pain or painful or reduced range of ROM. Neurological: The patient denies any symptoms of neurological impairment or TIA. Psychiatric: Denies anxiety or depression. Skin: Denies rash or lesions. All remaining ROS negative. PHYSICAL EXAM:  VITALS:  /71   Pulse 87   Temp 97.2 °F (36.2 °C) (Oral)   Resp 16   Ht 5' 6\" (1.676 m)   Wt 166 lb (75.3 kg)   SpO2 100%   BMI 26.79 kg/m² . Nursing note and vitals reviewed. Constitutional: Alert and oriented times x3, no acute distress, and cooperative to examination with appropriate mood and affect. HEENT:   Head:         Normocephalic and atraumatic. Mouth/Throat:          Mucous membranes are normal.   Eyes:         EOM are normal. No scleral icterus. Nose:    The external appearance of the nose is normal  Ears: The ears appear normal to external inspection. Cardiovascular:       Normal rate, regular rhythm. Pulmonary/Chest:  Lungs diminished bilaterally. Few rhonchi that clear with cough  Abdominal:          Soft. No tenderness. Hypoactive bowel sounds             Genitalia:    Amaral draining clear yellow urine  Musculoskeletal:    Normal range of motion. He exhibits no edema or tenderness of lower extremities. Neurological:    Alert and oriented.      DATA:  CBC:   Lab Results   Component Value Date    WBC 8.8 04/11/2019    RBC 3.97 04/11/2019    HGB 12.8 04/11/2019    HCT 37.2 04/11/2019    MCV 93.7 04/11/2019    MCH 32.2 04/11/2019    MCHC 34.4 04/11/2019    RDW 15.7 06/20/2015     04/11/2019    MPV 9.8 04/11/2019     BMP:    Lab Results   Component Value Date     04/11/2019    K 4.5 04/11/2019     04/11/2019    CO2 19 04/11/2019    BUN 17 04/11/2019    CREATININE 1.0 04/11/2019    CALCIUM 7.9 04/11/2019

## 2019-04-11 NOTE — PROGRESS NOTES
[address]-23/023-A    Time In: 1320  Time Out: 1400  Timed Code Treatment Minutes: 40 Minutes  Minutes: 40          Date: 2019  Patient Name: Donnell Alston,  Gender:  male        MRN: [id number]  : 1950  (71 y.o.)     Referring Practitioner: Nancy JIMENEZ  Diagnosis: Primary O/A right knee  Additional Pertinent Hx: Pt admitted 4-10 for TKR right LE this date. Past Medical History:   Diagnosis Date    Arthritis     COPD (chronic obstructive pulmonary disease) [facility]     Hyperlipidemia     Hypertension     Numbness and tingling     hands and feet    Spinal stenosis of lumbar region with neurogenic claudication      Past Surgical History:   Procedure Laterality Date    CERVICAL SPINE SURGERY      [employer]. [address]    KNEE ARTHROSCOPY Right     LUMBAR LAMINECTOMY  2015    L2-S1 Lumbar Laminectomy    ROTATOR CUFF REPAIR Bilateral     TOTAL KNEE ARTHROPLASTY Right 4/10/2019    RIGHT TOTAL KNEE REPLACEMENT performed by Ludwin Bentley MD [facility]       Restrictions/Precautions:  Weight Bearing, General Precautions, Fall Risk     Right Lower Extremity Weight Bearing: Weight Bearing As Tolerated              Other position/activity restrictions: no fem block,WBAT right LE       Prior Level of Function:  ADL Assistance: Independent  Homemaking Assistance: Independent  Ambulation Assistance: Independent  Transfer Assistance: Independent  Additional Comments: Pt was not using AD prior to admission    Subjective:     Subjective: Pt very pleasant and cooerpative. Motivated. Pt left at EOB with bed alarm on , wife planning to assit with bathing and gown change and nurse informed.      Pain:   .      4-10- right knee    Social/Functional:  Lives With: Spouse  Type of Home: House  Home Layout: One level  Home Access: Stairs to enter with rails  Entrance Stairs - Number of Steps: 3 car transfer  Current Treatment Recommendations: Strengthening, Gait Training, ROM, Balance Training, Functional Mobility Training, Transfer Training, Home Exercise Program, Stair training, Endurance Training    Goals:  Patient goals : Go home with family    Short term goals  Time Frame for Short term goals: 1 week  Short term goal 1: supine to sit and return with Mod I to get in and out of bed   Short term goal 2: sit to stand with Mod I to get on and off various surfaces  Short term goal 3: ambulate with RW 50 feet or more and SBA to walk safely in the home  Short term goal 4: ascend/descend 3 steps with HR with CGA to enter home  Short term goal 5: car transfer with min A to leave hospital for home    Long term goals  Time Frame for Long term goals : NA due to short ELOS  If patient is discharged prior to progress note completion, this note is to serve as the discharge note with all goals being unmet unless indicated otherwise.

## 2019-04-11 NOTE — PROGRESS NOTES
[address] 60  INPATIENT OCCUPATIONAL THERAPY  [facility] ORTHOPEDICS 7K  EVALUATION    Time:  Time In: 9024  Time Out: 1012  Timed Code Treatment Minutes: 8 Minutes  Minutes: 14          Date: 2019  Patient Name: Aiden Arambula,   Gender: male      MRN: [id number]  : 1950  (71 y.o.)  Referring Practitioner: TONY Hill  Diagnosis: primary OA of right knee   Additional Pertinent Hx: Pt admitted 4-10 for TKR right LE this date. Restrictions/Precautions:  Weight Bearing, General Precautions, Fall Risk     Right Lower Extremity Weight Bearing: Weight Bearing As Tolerated              Other position/activity restrictions: no fem block,WBAT right LE       Past Medical History:   Diagnosis Date    Arthritis     COPD (chronic obstructive pulmonary disease) ([facility]     Hyperlipidemia     Hypertension     Numbness and tingling     hands and feet    Spinal stenosis of lumbar region with neurogenic claudication      Past Surgical History:   Procedure Laterality Date    CERVICAL SPINE SURGERY      [place]  [address]    KNEE ARTHROSCOPY Right     LUMBAR LAMINECTOMY  2015    L2-S1 Lumbar Laminectomy    ROTATOR CUFF REPAIR Bilateral     TOTAL KNEE ARTHROPLASTY Right 4/10/2019    RIGHT TOTAL KNEE REPLACEMENT performed by Miracle Dubon MD at [facility]  Chart Reviewed: Yes(med chart review, orders, images)  Patient assessed for rehabilitation services?: Yes         General:  Overall Orientation Status: Within Functional Limits    Vision: Within Functional Limits    Hearing: Within functional limits         Pain:       min complaints of R knee pain  Social/Functional History:  Lives With: Spouse  Type of Home: House  Home Layout: One level  Home Access: Stairs to enter with rails  Entrance Stairs - Number of Steps: 3 steps with 1 HR  Home Equipment: Rolling walker          Receives Help From: Family  ADL Assistance: [employer] [facility] endurance, Decreased strength, Decreased balance, Decreased safe awareness, Decreased high-level IADLs  Prognosis: Good    Clinical Decision Making: Clinical Decision making was of Moderate Complexity as the result of analysis of data from a detailed assessment, a consideration of several treatment options, the presence of comorbidities affecting the plan of care and the need for minimal to moderate modifications or assistance required to complete the evaluation. Discharge Recommendations:  Discharge Recommendations: Continue to assess pending progress    Patient Education:  Patient Education: OT POC, role of OT,     Equipment Recommendations:  Equipment Needed: No  Other: cont to assess     Safety:  Safety Devices in place: Yes  Type of devices: All fall risk precautions in place(leftin w/c)    Plan:  Times per week: 6x  Current Treatment Recommendations: Strengthening, Balance Training, Functional Mobility Training, Endurance Training, Self-Care / ADL, Patient/Caregiver Education & Training, Safety Education & Training    Goals:  Patient goals : to go home    Short term goals  Time Frame for Short term goals: by discharge  Short term goal 1: Pt will complete LB ADls with S and LHAE prn with no cues for safety   Short term goal 2: Pt will complete standing ADL wtih S for > 4 minutes with 1-2 hand release and no LOB for ease with self care  Short term goal 3: Pt will complete functional mobility HH distances with S and RW with no cues for safety   Short term goal 4: Pt will complete functional transfers with S and no cues for safety including to/from toilet  Short term goal 5: Pt will demo indep wtih BUE strenthenign HEP to increase UB strength needed for ease with ADLS/transfers.    Long term goals  Time Frame for Long term goals : No LTG established d/t short ELOS    Evaluation Complexity: Based on the findings of patient history, examination, clinical presentation, and decision making during this evaluation,

## 2019-04-11 NOTE — PROGRESS NOTES
[facility] - 7K-23/023-A    Time In: 0845  Time Out: 0930  Timed Code Treatment Minutes: 39 Minutes  Minutes: 45          Date: 2019  Patient Name: Hasmukh Luna,  Gender:  male        MRN: [id number]  : 1950  (71 y.o.)     Referring Practitioner: Dian JIMENEZ  Diagnosis: Primary O/A right knee  Additional Pertinent Hx: Pt admitted 4-10 for TKR right LE this date. Past Medical History:   Diagnosis Date    Arthritis     COPD (chronic obstructive pulmonary disease) ([facility] [address]     Hyperlipidemia     Hypertension     Numbness and tingling     hands and feet    Spinal stenosis of lumbar region with neurogenic claudication      Past Surgical History:   Procedure Laterality Date    CERVICAL SPINE SURGERY      [employer] [address] [employer]    KNEE ARTHROSCOPY Right     LUMBAR LAMINECTOMY  2015    L2-S1 Lumbar Laminectomy    ROTATOR CUFF REPAIR Bilateral 2006    TOTAL KNEE ARTHROPLASTY Right 4/10/2019    RIGHT TOTAL KNEE REPLACEMENT performed by Tomi Holbrook MD at [facility]       Restrictions/Precautions:  Weight Bearing, General Precautions, Fall Risk     Right Lower Extremity Weight Bearing: Weight Bearing As Tolerated              Other position/activity restrictions: no fem block,WBAT right LE       Prior Level of Function:  ADL Assistance: Independent  Homemaking Assistance: Independent  Ambulation Assistance: Independent  Transfer Assistance: Independent  Additional Comments: Pt was not using AD prior to admission    Subjective:     Subjective: Pt very pleasant and cooperative. Wife present in room and accompanied entire session.  Another younger female (daughter?) family member in room only    Pain:   .      5-6/10- right knee     Social/Functional:  Lives With: Spouse  Type of Home: House  Home Layout: One level  Home Access: Stairs to enter with rails  Entrance Stairs - Number of Steps: 3 assist    Patient Education:  Patient Education: car and steps     Equipment Recommendations:  Equipment Needed: No    Safety:  Type of devices: All fall risk precautions in place, Call light within reach, Chair alarm in place, Left in chair  Restraints  Initially in place: No    Plan:  Times per week: BID 7 X O  Times per day: Twice a day  Specific instructions for Next Treatment: TKR protocol, mobility, gait, steps, car transfer  Current Treatment Recommendations: Strengthening, Gait Training, ROM, Balance Training, Functional Mobility Training, Transfer Training, Home Exercise Program, Stair training, Endurance Training    Goals:  Patient goals : Go home with family    Short term goals  Time Frame for Short term goals: 1 week  Short term goal 1: supine to sit and return with Mod I to get in and out of bed   Short term goal 2: sit to stand with Mod I to get on and off various surfaces  Short term goal 3: ambulate with RW 50 feet or more and SBA to walk safely in the home  Short term goal 4: ascend/descend 3 steps with HR with CGA to enter home  Short term goal 5: car transfer with min A to leave hospital for home    Long term goals  Time Frame for Long term goals : NA due to short ELOS  If patient is discharged prior to progress note completion, this note is to serve as the discharge note with all goals being unmet unless indicated otherwise.

## 2019-04-11 NOTE — PROCEDURES
A Bladder scan was performed at 0431 . The patient's last void was at 8 hours ago per RN . The residual amount was measured to be >822 ML. Report of results was given to [facility].

## 2019-04-11 NOTE — PROGRESS NOTES
CLINICAL PHARMACY NOTE: MEDS TO [address] Select Patient?: No  Total # of Prescriptions Filled: 1   The following medications were delivered to the patient:    Total # of Interventions Completed: 2  Time Spent (min): 30    Additional Documentation:

## 2019-04-12 VITALS
HEIGHT: 66 IN | TEMPERATURE: 99 F | BODY MASS INDEX: 26.68 KG/M2 | DIASTOLIC BLOOD PRESSURE: 69 MMHG | HEART RATE: 92 BPM | SYSTOLIC BLOOD PRESSURE: 113 MMHG | RESPIRATION RATE: 16 BRPM | WEIGHT: 166 LBS | OXYGEN SATURATION: 91 %

## 2019-04-12 PROCEDURE — 6370000000 HC RX 637 (ALT 250 FOR IP): Performed by: PHYSICIAN ASSISTANT

## 2019-04-12 PROCEDURE — 97116 GAIT TRAINING THERAPY: CPT

## 2019-04-12 PROCEDURE — 97530 THERAPEUTIC ACTIVITIES: CPT

## 2019-04-12 PROCEDURE — 2580000003 HC RX 258: Performed by: PHYSICIAN ASSISTANT

## 2019-04-12 PROCEDURE — 97110 THERAPEUTIC EXERCISES: CPT

## 2019-04-12 PROCEDURE — 99231 SBSQ HOSP IP/OBS SF/LOW 25: CPT | Performed by: FAMILY MEDICINE

## 2019-04-12 RX ADMIN — OXYCODONE AND ACETAMINOPHEN 2 TABLET: 5; 325 TABLET ORAL at 07:51

## 2019-04-12 RX ADMIN — OXYCODONE AND ACETAMINOPHEN 2 TABLET: 5; 325 TABLET ORAL at 03:22

## 2019-04-12 RX ADMIN — Medication 10 ML: at 08:03

## 2019-04-12 RX ADMIN — GABAPENTIN 100 MG: 100 CAPSULE ORAL at 08:03

## 2019-04-12 RX ADMIN — ASPIRIN 325 MG: 325 TABLET, DELAYED RELEASE ORAL at 08:03

## 2019-04-12 RX ADMIN — LISINOPRIL 20 MG: 20 TABLET ORAL at 08:03

## 2019-04-12 RX ADMIN — DOCUSATE SODIUM 100 MG: 100 CAPSULE, LIQUID FILLED ORAL at 08:03

## 2019-04-12 RX ADMIN — OXYCODONE AND ACETAMINOPHEN 2 TABLET: 5; 325 TABLET ORAL at 11:56

## 2019-04-12 ASSESSMENT — PAIN SCALES - GENERAL
PAINLEVEL_OUTOF10: 2
PAINLEVEL_OUTOF10: 8
PAINLEVEL_OUTOF10: 7
PAINLEVEL_OUTOF10: 3
PAINLEVEL_OUTOF10: 8

## 2019-04-12 ASSESSMENT — PAIN DESCRIPTION - PROGRESSION: CLINICAL_PROGRESSION: GRADUALLY IMPROVING

## 2019-04-12 ASSESSMENT — PAIN DESCRIPTION - DESCRIPTORS: DESCRIPTORS: ACHING

## 2019-04-12 ASSESSMENT — PAIN DESCRIPTION - LOCATION: LOCATION: KNEE

## 2019-04-12 ASSESSMENT — PAIN DESCRIPTION - ORIENTATION: ORIENTATION: RIGHT

## 2019-04-12 ASSESSMENT — PAIN DESCRIPTION - ONSET: ONSET: ON-GOING

## 2019-04-12 ASSESSMENT — PAIN DESCRIPTION - PAIN TYPE: TYPE: ACUTE PAIN;SURGICAL PAIN

## 2019-04-12 ASSESSMENT — PAIN DESCRIPTION - FREQUENCY: FREQUENCY: CONTINUOUS

## 2019-04-12 NOTE — PROGRESS NOTES
Discussed with patient best times for appointment with PCP and OPPT, verbalized he will make appointments due to transportation needs. Information faxed to Wes Alvarez ([phone]) includes face sheet and OPPT order. Information relayed on discharge papers.

## 2019-04-12 NOTE — PROGRESS NOTES
CLINICAL PHARMACY NOTE: MEDS TO [address] Select Patient?: No  Total # of Prescriptions Filled: 3   The following medications were delivered to the patient:    Total # of Interventions Completed: 3  Time Spent (min): 30    Additional Documentation:

## 2019-04-12 NOTE — PROGRESS NOTES
Patient discharged to home, patient's prearranged transportation to bring patient home via private vehicle. Transport takes patient down to discharge lobby via wheelchair. Discharge packet went over with patient and family members  including discharge instructions. Discharge instructions went over with patient include incision care, when able to shower, and appointment information. Patient left with belongings and discharge packet including medications filled by meds to beds pharmacy. Patient's questions and concerns were addressed, patient leaves stable and ambulatory. Susana-hex 4% soap given to patient. Patient and family educated on use of soap patient and family verbalize understanding on use of soap. Patient and family educated on handy catheter care via demonstration, verbalize understanding.

## 2019-04-12 NOTE — PROGRESS NOTES
Physical Therapy   [address] - 7K-23/023-A    Time In: 1125  Time Out: 1155  Timed Code Treatment Minutes: 30 Minutes  Minutes: 30          Date: 2019  Patient Name: Cassie Viramontes,  Gender:  male        MRN: [id number]  : 1950  (71 y.o.)     Referring Practitioner: Susana JIMENEZ  Diagnosis: Primary O/A right knee  Additional Pertinent Hx: Pt admitted 4-10 for TKR right LE this date. Past Medical History:   Diagnosis Date    Arthritis     COPD (chronic obstructive pulmonary disease) ([facility] [address]     Hyperlipidemia     Hypertension     Numbness and tingling     hands and feet    Spinal stenosis of lumbar region with neurogenic claudication      Past Surgical History:   Procedure Laterality Date    CERVICAL SPINE SURGERY      [employer]. [address] [employer]    KNEE ARTHROSCOPY Right     LUMBAR LAMINECTOMY  2015    L2-S1 Lumbar Laminectomy    ROTATOR CUFF REPAIR Bilateral     TOTAL KNEE ARTHROPLASTY Right 4/10/2019    RIGHT TOTAL KNEE REPLACEMENT performed by Claribel Graves MD at [facility]       Restrictions/Precautions:  Weight Bearing, General Precautions, Fall Risk     Right Lower Extremity Weight Bearing: Weight Bearing As Tolerated              Other position/activity restrictions: no fem block,WBAT right LE       Prior Level of Function:  ADL Assistance: Independent  Homemaking Assistance: Independent  Ambulation Assistance: Independent  Transfer Assistance: Independent  Additional Comments: Pt was not using AD prior to admission    Subjective:     Subjective: RN approved second session. Pt in chair at arrival, requesting to use restroom. Lunch tray arrives at end of session.      Pain:   .  Pain Assessment  Pain Level: 7       Social/Functional:  Lives With: Spouse  Type of Home: House  Home Layout: One level  Home Access: Stairs to enter with rails  Entrance Stairs - Number of Steps: 3 steps with 1 HR  Home Equipment: Rolling walker     Objective:        Transfers  Sit to Stand: Contact guard assistance  Stand to sit: Contact guard assistance       Ambulation 1  Surface: level tile  Device: Rolling Walker  Assistance: Contact guard assistance  Quality of Gait: decrease step length, decrease foot clearance, decrease stance time on RLE, lacks TKE, slight unsteady, no LOB  Distance: [id number]         Balance  Comments: static standing ~2min for pericare, CGAx1, steady, no LOB           Exercises:  Exercises  Comments: pt completed BLE supine ex x10;ankle pumps, glut set, quad set, heel slides, hip abd/add, short arch quads and straight leg raises, all to increase LE strength for improved functional mobility. Activity Tolerance:  Activity Tolerance: Patient Tolerated treatment well    Assessment: Body structures, Functions, Activity limitations: Decreased functional mobility , Decreased endurance, Decreased ROM, Decreased strength, Decreased balance  Assessment: pt tolerated session fairly well at this time. Pt up in bedside chair at end of session. Discussed HEP and also stretches to perform at home. Pt would benefit from cont therapy at this time. Prognosis: Good     REQUIRES PT FOLLOW UP: Yes    Discharge Recommendations:  Discharge Recommendations: Patient would benefit from continued therapy after discharge, Outpatient PT, 24 hour supervision or assist    Patient Education:  Patient Education: ambulation, POC    Equipment Recommendations:  Equipment Needed: No    Safety:  Type of devices:  All fall risk precautions in place, Bed alarm in place, Call light within reach, Nurse notified, Left in bed  Restraints  Initially in place: No    Plan:  Times per week: BID 7 X O  Times per day: Twice a day  Specific instructions for Next Treatment: TKR protocol, mobility, gait, steps, car transfer  Current Treatment Recommendations: Strengthening, Gait Training, ROM, Balance Training, Functional Mobility Training, Transfer Training, Home Exercise Program, Stair training, Endurance Training    Goals:  Patient goals : Go home with family    Short term goals  Time Frame for Short term goals: 1 week  Short term goal 1: supine to sit and return with Mod I to get in and out of bed   Short term goal 2: sit to stand with Mod I to get on and off various surfaces  Short term goal 3: ambulate with RW 50 feet or more and SBA to walk safely in the home  Short term goal 4: ascend/descend 3 steps with HR with CGA to enter home  Short term goal 5: car transfer with min A to leave hospital for home    Long term goals  Time Frame for Long term goals : NA due to short ELOS  If patient is discharged prior to progress note completion, this note is to serve as the discharge note with all goals being unmet unless indicated otherwise.             AM-PAC Inpatient Mobility without Stair Climbing Raw Score : 15  AM-PAC Inpatient without Stair Climbing T-Scale Score : 43.03  Mobility Inpatient CMS 0-100% Score: 47.43  Mobility Inpatient without Stair CMS G-Code Modifier : CK

## 2019-04-12 NOTE — PLAN OF CARE
Problem: Pain:  Goal: Pain level will decrease  Description  Pain level will decrease  Outcome: Ongoing  Note:   Pain level decreasing. Pt taking prn pain medication. Pt pain goal 3/10 on scale. Pt uses non pharmacological pain relieve strategies to help him decreased pain. Problem: Musculor/Skeletal Functional Status  Goal: Highest potential functional level  Outcome: Ongoing  Note:   Pt up with STB to the bathroom and return to bed. Ambulating tolerating well. Pt working with PT/OT in the morning. Problem: Cardiovascular  Goal: No DVT, peripheral vascular complications  Outcome: Ongoing  Note:   Denies chest pain and calf tenderness. No s/s DVTs. VS stable. Problem: Respiratory  Goal: No pulmonary complications  Outcome: Ongoing  Note:   No pulmonary complication this shift. Lung sounds clear throughout. Problem: GI  Goal: No bowel complications  Outcome: Ongoing  Note:   BS active and passing gas. Taking prescribed medications to assist with Bm. Problem:   Goal: Adequate urinary output  Outcome: Ongoing  Note:   Pt voiding adequate amounts of clear yellow urine. Problem: Nutrition  Goal: Optimal nutrition therapy  Outcome: Ongoing  Note:   Pt on general diet. Pt report to have good appetite. Problem: Skin Integrity/Risk  Goal: No skin breakdown during hospitalization  Outcome: Ongoing  Note:   No new skin impairments noted. Pt able to repositioned himself. Problem: Daily Care:  Goal: Daily care needs are met  Description  Daily care needs are met  Outcome: Ongoing  Note:   Pt independent with daily care needs. Pt able to use call light to request assistance. Problem: Discharge Planning:  Goal: Patients continuum of care needs are met  Description  Patients continuum of care needs are met  Outcome: Ongoing  Note:   Pt plans home at discharge. Care manager and social working helping with discharge needs. Care plan reviewed with patient.  Patient  verbalizes understanding of the plan of care and contribute to goal setting.

## 2019-04-12 NOTE — CARE COORDINATION
4/12/19, 10:02 AM    Discharge plan discussed by  and . Discharge plan reviewed with patient/ family. Patient/ family verbalize understanding of discharge plan and are in agreement with plan. Understanding was demonstrated using the teach back method. Discharging home with family. Has DME. Spoke with nurse, OP therapy apts have been made along with urology f/u. No further needs.     Services After Discharge  Services At/After Discharge: PT
go home tomorrow with his family. He has a walker for home use. Denied need for bedside commode.  He said he is planning to do his OPPT in [place], [place] at [address] Evaluation: no

## 2019-04-12 NOTE — PROGRESS NOTES
[address]  INPATIENT OCCUPATIONAL THERAPY  [facility] ORTHOPEDICS 7K  DAILY NOTE    Time:  Time In: 1000  Time Out: 1027  Timed Code Treatment Minutes: 27 Minutes  Minutes: 27          Date: 2019  Patient Name: Heather Prince,   Gender: male      Room: [facility]23/023-A  MRN: [id number]  : 1950  (71 y.o.)  Referring Practitioner: Gabino Severe, PA  Diagnosis: primary OA of right knee   Additional Pertinent Hx: Pt admitted 4-10 for TKR right LE this date. Past Medical History:   Diagnosis Date    Arthritis     [facility]chronic obstructive [facility] disease) ([facility] [address]     Hyperlipidemia     Hypertension     Numbness and tingling     hands and feet    Spinal stenosis of lumbar region with neurogenic claudication      Past Surgical History:   Procedure Laterality Date    CERVICAL SPINE SURGERY      [place] [facility]    KNEE ARTHROSCOPY Right     LUMBAR LAMINECTOMY  2015    L2-S1 Lumbar Laminectomy    ROTATOR CUFF REPAIR Bilateral     TOTAL KNEE ARTHROPLASTY Right 4/10/2019    RIGHT TOTAL KNEE REPLACEMENT performed by Tonia Rubio MD at [facility]       Restrictions/Precautions:  Weight Bearing, General Precautions, Fall Risk     Right Lower Extremity Weight Bearing: Weight Bearing As Tolerated      Other position/activity restrictions: no fem block,WBAT right LE       Prior Level of Function:  ADL Assistance: Independent  Homemaking Assistance: Independent  Ambulation Assistance: Independent  Transfer Assistance: Independent  Additional Comments: Pt was not using AD prior to admission    Subjective  Patient assessed for rehabilitation services?: Yes    Comments: Patient seated in chair upon arrival. Rn ok'ed treatment.      Pain:      No Complaints    Objective     Transfers  Sit to stand: Minimal assistance  Stand to sit: Contact guard assistance  Transfer Comments: to from recliner       Balance  Sitting Balance: Supervision(seated in recliner)  Standing Balance: Contact guard assistance     Activity: In preparation for ambulation     Functional Mobility  Functional - Mobility Device: Rolling Walker  Assist Level: Contact guard assistance  Functional Mobility Comments: slow pace, pt ambulated in to garrison with verbal cues for walker safety. Patient vering towards right side with ambulation verbal cues for this. Patient aware of this and correcting        Additional Activities: Patient has his own walker and has a BSC. Education given on Knee precuations and various ways to completed IADL items with knee precautions. Briefly discussed [place] for LB dressing patient reported that he would have someone there to assist him. Activity Tolerance:  Activity Tolerance: Patient Tolerated treatment well    Assessment:     Performance deficits / Impairments: Decreased functional mobility , Decreased ADL status, Decreased endurance, Decreased strength, Decreased balance, Decreased safe awareness, Decreased high-level IADLs  Prognosis: Good    Discharge Recommendations:  Discharge Recommendations: Continue to assess pending progress    Patient Education:  Patient Education: Knee precautions  Equipment Recommendations:  Equipment Needed: No  Other: cont to assess     Safety:  Safety Devices in place: Yes  Type of devices:  All fall risk precautions in place, Call light within reach    Plan:  Times per week: 6x  Current Treatment Recommendations: Strengthening, Balance Training, Functional Mobility Training, Endurance Training, Self-Care / ADL, Patient/Caregiver Education & Training, Safety Education & Training    Goals:  Patient goals : to go home    Short term goals  Time Frame for Short term goals: by discharge  Short term goal 1: Pt will complete LB ADls with S and LHAE prn with no cues for safety   Short term goal 2: Pt will complete standing ADL wtih S for > 4 minutes with 1-2 hand release and no LOB for ease with self care  Short term goal 3: Pt will complete functional mobility HH distances with S and RW with no cues for safety   Short term goal 4: Pt will complete functional transfers with S and no cues for safety including to/from toilet  Short term goal 5: Pt will demo indep wtih BUE strenthenign HEP to increase UB strength needed for ease with ADLS/transfers. Long term goals  Time Frame for Long term goals : No LTG established d/t short ELOS  If patient is discharged prior to progress note completion, this note is to serve as the discharge note with all goals being unmet unless indicated otherwise.

## 2019-04-12 NOTE — PROGRESS NOTES
Physical Therapy   [facility] - 7K-23/023-A    Time In: 0803  Time Out: 4144  Timed Code Treatment Minutes: 26 Minutes  Minutes: 26          Date: 2019  Patient Name: Latrice Dorado,  Gender:  male        MRN: [id number]  : 1950  (71 y.o.)     Referring Practitioner: Theresa JIMENEZ  Diagnosis: Primary O/A right knee  Additional Pertinent Hx: Pt admitted 4-10 for TKR right LE this date. Past Medical History:   Diagnosis Date    Arthritis     [facility] obstructive [facility]) ([facility] [address]     Hyperlipidemia     Hypertension     Numbness and tingling     hands and feet    Spinal stenosis of lumbar region with neurogenic claudication      Past Surgical History:   Procedure Laterality Date    CERVICAL SPINE SURGERY      [employer]. [address]    KNEE ARTHROSCOPY Right     LUMBAR LAMINECTOMY  2015    L2-S1 Lumbar Laminectomy    ROTATOR CUFF REPAIR Bilateral 2006    TOTAL KNEE ARTHROPLASTY Right 4/10/2019    RIGHT TOTAL KNEE REPLACEMENT performed by Sherie Tate MD at [facility]       Restrictions/Precautions:  Weight Bearing, General Precautions, Fall Risk     Right Lower Extremity Weight Bearing: Weight Bearing As Tolerated              Other position/activity restrictions: no fem block,WBAT right LE       Prior Level of Function:  ADL Assistance: Independent  Homemaking Assistance: Independent  Ambulation Assistance: Independent  Transfer Assistance: Independent  Additional Comments: Pt was not using AD prior to admission    Subjective:     Subjective: RN approved session. Pt in bed with family present. pt pleaseant and agreeable to amb and get up to chair.      Pain:   .  Pain Assessment  Pain Level: 7       Social/Functional:  Lives With: Spouse  Type of Home: House  Home Layout: One level  Home Access: Stairs to enter with rails  Entrance Stairs - Number of Steps: 3 steps with 1 HR  Home Equipment: Rolling walker     Objective:  Supine to Sit: Contact guard assistance(HOB elevated, guided RLE off EOB)  Scooting: Contact guard assistance(to EOB and back in bedside chair)    Transfers  Sit to Stand: Contact guard assistance(from EOB to walker, cues for hand placement)  Stand to sit: Contact guard assistance(into bedside chair, cues to reach back and control descent)       Ambulation 1  Surface: level tile  Device: Rolling Walker  Assistance: Contact guard assistance  Quality of Gait: decrease step length, decrease foot clearance, decrease stance time on RLE, lacks TKE, slight unsteady, no LOB  Distance: [id number]         Exercises:  Exercises  Comments: pt completed BLE seated ex x10;heel/toe raises, glut set, marches, hip abd/add, and long arch quads all to increase LE strength for improved functional mobilitiy. Activity Tolerance:  Activity Tolerance: Patient Tolerated treatment well    Assessment: Body structures, Functions, Activity limitations: Decreased functional mobility , Decreased endurance, Decreased ROM, Decreased strength, Decreased balance  Assessment: pt tolerated session fairly well at this time. Pt up in bedside chair at end of session. Pt would benefit from cont therapy at this time. Prognosis: Good     REQUIRES PT FOLLOW UP: Yes    Discharge Recommendations:  Discharge Recommendations: Patient would benefit from continued therapy after discharge, Outpatient PT, 24 hour supervision or assist    Patient Education:  Patient Education: ambulation, POC    Equipment Recommendations:  Equipment Needed: No    Safety:  Type of devices:  All fall risk precautions in place, Bed alarm in place, Call light within reach, Nurse notified, Left in bed  Restraints  Initially in place: No    Plan:  Times per week: BID 7 X O  Times per day: Twice a day  Specific instructions for Next Treatment: TKR protocol, mobility, gait, steps, car transfer  Current Treatment Recommendations: Strengthening, Gait Training, ROM, Balance Training, Functional Mobility Training, Transfer Training, Home Exercise Program, Stair training, Endurance Training    Goals:  Patient goals : Go home with family    Short term goals  Time Frame for Short term goals: 1 week  Short term goal 1: supine to sit and return with Mod I to get in and out of bed   Short term goal 2: sit to stand with Mod I to get on and off various surfaces  Short term goal 3: ambulate with RW 50 feet or more and SBA to walk safely in the home  Short term goal 4: ascend/descend 3 steps with HR with CGA to enter home  Short term goal 5: car transfer with min A to leave hospital for home    Long term goals  Time Frame for Long term goals : NA due to short ELOS  If patient is discharged prior to progress note completion, this note is to serve as the discharge note with all goals being unmet unless indicated otherwise.             AM-PAC Inpatient Mobility without Stair Climbing Raw Score : 15  AM-PAC Inpatient without Stair Climbing T-Scale Score : 43.03  Mobility Inpatient CMS 0-100% Score: 47.43  Mobility Inpatient without Stair CMS G-Code Modifier : SILVIANO

## 2019-04-12 NOTE — PROGRESS NOTES
Department of Orthopedic Surgery   Progress Note      SUBJECTIVE  Decreased right knee pain    OBJECTIVE    Physical    VITALS:  /69   Pulse 99   Temp 98.4 °F (36.9 °C) (Oral)   Resp 16   Ht 5' 6\" (1.676 m)   Wt 166 lb (75.3 kg)   SpO2 90%   BMI 26.79 kg/m²   24HR INTAKE/OUTPUT:      Intake/Output Summary (Last 24 hours) at 4/12/2019 0805  Last data filed at 4/12/2019 0803  Gross per 24 hour   Intake 1010 ml   Output 2900 ml   Net -1890 ml     URINARY CATHETER OUTPUT (Amaral):  Urethral Catheter 16 fr-Output (mL): 700 mL  DRAIN/TUBE OUTPUT:     MUSCULOSKELETAL:  motor strength is 5 out of 5 all extremities bilaterally  Dsg dry and intact   ROM 0 to 90 degrees   N/V intact right leg   Incision healing well   Mild swelling right knee     Data    CBC with Differential:    Lab Results   Component Value Date    WBC 8.8 04/11/2019    RBC 3.97 04/11/2019    HGB 12.8 04/11/2019    HCT 37.2 04/11/2019     04/11/2019    MCV 93.7 04/11/2019    MCH 32.2 04/11/2019    MCHC 34.4 04/11/2019    RDW 15.7 06/20/2015    NRBC 0 04/11/2019    SEGSPCT 74.4 04/11/2019    MONOPCT 11.1 04/11/2019    MONOSABS 1.0 04/11/2019    LYMPHSABS 1.2 04/11/2019    EOSABS 0.1 04/11/2019    BASOSABS 0.0 04/11/2019    DIFFTYPE see below 06/18/2015     BMP:    Lab Results   Component Value Date     04/11/2019    K 4.5 04/11/2019     04/11/2019    CO2 19 04/11/2019    BUN 17 04/11/2019    LABALBU 4.0 06/05/2015    CREATININE 1.0 04/11/2019    CALCIUM 7.9 04/11/2019    LABGLOM 74 04/11/2019    GLUCOSE 117 04/11/2019     Current Inpatient Medications        ASSESSMENT AND PLAN      1.  Discharge to home today    Tonia Rubio MD

## 2019-04-12 NOTE — FLOWSHEET NOTE
04/12/19 0751   Incision 04/10/19 Knee Right   Date First Assessed/Time First Assessed: 04/10/19 4948   Location: Knee  Wound Location Orientation: Right   Wound Assessment Edges well approximated   Ivone-wound Assessment Dry;Clean; Intact   Closure Staples   Drainage Amount Scant   Drainage Description Serosanguinous   Odor None   Dressing/Treatment Dry Dressing;ABD   Dressing Changed Changed/New   Dressing Status Clean;Dry; Intact; Changed       Dressing changed by MD Natividad Pandya, no s/s infection noted. Patient tolerated well.

## 2019-04-13 NOTE — DISCHARGE SUMMARY
[address] [id number]                               DISCHARGE SUMMARY    PATIENT NAME: Nikunj Lewis                       :        1950  MED REC NO:   [id number]                           ROOM:       0023  ACCOUNT NO:   [de-identified]                           ADMIT DATE: 04/10/2019  PROVIDER:     Audrey Phillips. Sole Conner M.D.             Howe Kris: 2019      ADDENDUM    The patient was originally supposed to be discharged on 2019. Unfortunately, he developed urinary retention and decreased PCO2. We  kept him in observation for a day. He progressed with physical therapy. At the time of discharge, his knee was healing well. He had range of  motion of 0 to 90 degrees. He felt much better. He was off the oxygen. The oxygen was felt to be secondary to the narcotics. As far as his  urinary retention, we did call the urologist.  They are going to keep a  Amaral catheter in place, and he is going to be discharged with the Amaral  catheter. There is no real change from the discharge summary except for  he spent the extra day because of these two problems. Now, he will be  discharged to home with a followup in my office in two weeks. He will  be on the Keflex, Percocet, and aspirin. He has a push range of motion  in hip, knee, and ankle. We do not need the CPM because he is doing so  well. He can do outpatient physical therapy. Hopefully, in two weeks,  we will start staple removal.        THERESA Levin M.D.    D: 2019 8:08:48       T: 2019 22:15:24     [email or web address]  Job#: [id number]     Doc#: [id number]    CC:

## 2019-04-17 ENCOUNTER — PROCEDURE VISIT (OUTPATIENT)
Dept: UROLOGY | Age: 69
End: 2019-04-17

## 2019-04-17 VITALS
HEIGHT: 66 IN | WEIGHT: 173.7 LBS | SYSTOLIC BLOOD PRESSURE: 118 MMHG | DIASTOLIC BLOOD PRESSURE: 80 MMHG | BODY MASS INDEX: 27.92 KG/M2

## 2019-04-17 DIAGNOSIS — R31.29 HEMATURIA, MICROSCOPIC: ICD-10-CM

## 2019-04-17 DIAGNOSIS — Z12.5 SCREENING FOR PROSTATE CANCER: Primary | ICD-10-CM

## 2019-04-17 DIAGNOSIS — R33.9 RETENTION OF URINE: ICD-10-CM

## 2019-04-17 PROCEDURE — 51700 IRRIGATION OF BLADDER: CPT | Performed by: UROLOGY

## 2019-04-17 PROCEDURE — 99213 OFFICE O/P EST LOW 20 MIN: CPT | Performed by: UROLOGY

## 2019-04-17 ASSESSMENT — ENCOUNTER SYMPTOMS
CONSTIPATION: 0
EYE PAIN: 0
CHEST TIGHTNESS: 0
EYE ITCHING: 0
SHORTNESS OF BREATH: 0
DIARRHEA: 0

## 2019-04-17 NOTE — PROGRESS NOTES
[address] Teddy Hoffman  Dept: [phone]  Loc: [phone]  Visit Date: 4/17/2019    Subjective:      Patient ID: Tu Carr 71 y.o. male 1950    Chief Complaint   Patient presents with   [address] from [address] [facility] Urinary Retention       HPI 70-year-old white male returns today for a follow-up after acute urinary retention after total knee replacement. He is here today for a voiding trial.  There is a question about microscopic hematuria on a preoperative urine analysis. The only UA I see in the chart is on 4/11/2019 the day after his knee surgery. His baseline voiding pattern: He states he is always had an overactive bladder. He has nocturia ×2 or 3 and daytime frequency, every 2 hours. This is not disruptive to his quality of life.     Past Medical History:   Diagnosis Date    Arthritis     COPD (chronic obstructive pulmonary disease) ([address]     Hyperlipidemia     Hypertension     Numbness and tingling     hands and feet    Spinal stenosis of lumbar region with neurogenic claudication        Social History     Socioeconomic History    Marital status:      Spouse name: Not on file    Number of children: Not on file    Years of education: Not on file    Highest education level: Not on file   Occupational History    Not on file   Social Needs    Financial resource strain: Not on file    Food insecurity:     Worry: Not on file     Inability: Not on file    Transportation needs:     Medical: Not on file     Non-medical: Not on file   Tobacco Use    Smoking status: Light Tobacco Smoker     Packs/day: 0.10     Types: Cigars    Smokeless tobacco: Former User   Substance and Sexual Activity    Alcohol use: No    Drug use: No    Sexual activity: Not on file   Lifestyle    Physical activity:     Days per week: Not on file     Minutes per session: Not on file    Stress: Not on file Relationships    Social connections:     Talks on phone: Not on file     Gets together: Not on file     Attends Religion service: Not on file     Active member of club or organization: Not on file     Attends meetings of clubs or organizations: Not on file     Relationship status: Not on file    Intimate partner violence:     Fear of current or ex partner: Not on file     Emotionally abused: Not on file     Physically abused: Not on file     Forced sexual activity: Not on file   Other Topics Concern    Not on file   Social History Narrative    Not on file       Family History   Problem Relation Age of Onset    Heart Disease Father         CABG triple    Cancer Sister     Early Death Neg Hx     High Blood Pressure Neg Hx     Stroke Neg Hx        Past Surgical History:   Procedure Laterality Date    CERVICAL SPINE SURGERY  2007    [employer] [address] [id number]    KNEE ARTHROSCOPY Right 1999    LUMBAR LAMINECTOMY  06/17/2015    L2-S1 Lumbar Laminectomy    ROTATOR CUFF REPAIR Bilateral 2006    TOTAL KNEE ARTHROPLASTY Right 4/10/2019    RIGHT TOTAL KNEE REPLACEMENT performed by Sherie Tate MD at [facility]       No Known Allergies      Current Outpatient Medications:     tamsulosin (FLOMAX) 0.4 MG capsule, Take 1 capsule by mouth nightly, Disp: 30 capsule, Rfl: 3    nicotine (NICODERM CQ) 14 MG/24HR, Place 1 patch onto the skin every 24 hours, Disp: , Rfl:     aspirin 325 MG EC tablet, Take 1 tablet by mouth 2 times daily for 2 weeks. Then take 1 tablet by mouth once daily for an additional 4 weeks. , Disp: 56 tablet, Rfl: 0    oxyCODONE-acetaminophen (PERCOCET) 5-325 MG per tablet, Take 1 tablet by mouth every 6 hours as needed for Pain for up to 7 days. Intended supply: 7 days.  Take lowest dose possible to manage pain, Disp: 28 tablet, Rfl: 0    cephALEXin (KEFLEX) 500 MG capsule, Take 1 capsule by mouth 2 times daily for 7 days, Disp: 14 capsule, Rfl: 0    gabapentin (NEURONTIN) 100 MG capsule, Take 100 mg by mouth 2 times daily. , Disp: , Rfl:     atorvastatin (LIPITOR) 20 MG tablet, Take 20 mg by mouth daily, Disp: , Rfl:     lisinopril (PRINIVIL;ZESTRIL) 20 MG tablet, Take 20 mg by mouth daily, Disp: , Rfl:     Review of Systems   Constitutional: Negative for chills and fever. HENT: Negative for hearing loss, mouth sores and nosebleeds. Eyes: Negative for pain and itching. Respiratory: Negative for chest tightness and shortness of breath. Cardiovascular: Negative for chest pain and palpitations. Gastrointestinal: Negative for constipation and diarrhea. Genitourinary: Positive for difficulty urinating, dysuria, hematuria and penile swelling. Negative for flank pain and scrotal swelling. Musculoskeletal: Positive for joint swelling. Negative for neck pain. Skin: Negative for rash and wound. Neurological: Negative for dizziness, seizures and light-headedness. Hematological: Does not bruise/bleed easily. /80   Ht 5' 6\" (1.676 m)   Wt 173 lb 11.2 oz (78.8 kg)   BMI 28.04 kg/m²     Objective:   Physical Exam well-nourished, well-developed male alert and oriented ×3. Assessment:       Diagnosis Orders   1. Screening for prostate cancer  PSA Screening   2. Retention of urine     3. Hematuria, microscopic         Mr. Suzanne Masterson today in follow-up for Screening for prostate cancer [Z12.5]. The patient was able to void  around the catheter. He was instructed to return to the emergency department in [place] where he lives if he has urinary retention. I discussed common causes of microscopic hematuria. Discussed a basic evaluation of this persists. Discussed screening PSA for prostate cancer. Plan:        Screening PSA in approximately 6 weeks. Follow-up office visit in 2 months.

## 2019-04-17 NOTE — PROGRESS NOTES
With catheter in place 110 cc water instilled into bladder. Balloon deflated, catheter removed without difficulty. Pt then had a bladder spasm that forced all 110cc of water out of the bladder through the catheter. I then instilled 140cc of water into the bladder. Patient stated his bladder was full and there was a lot of pressure. I removed the catheter and he voided 130 cc.

## 2019-06-03 ENCOUNTER — TELEPHONE (OUTPATIENT)
Dept: UROLOGY | Age: 69
End: 2019-06-03

## 2019-06-03 LAB — PROSTATE SPECIFIC ANTIGEN: 0.7 NG/ML

## 2019-06-03 NOTE — TELEPHONE ENCOUNTER
I attempted to call the patient and the number listed is for Dilma Yun who drives him to his appointments. The patient is brian. Dilma Yun is not on the [facility]. Do you want him contacted via mail. Please advise. Thank you.

## 2019-06-04 ENCOUNTER — TELEPHONE (OUTPATIENT)
Dept: UROLOGY | Age: 69
End: 2019-06-04

## 2019-06-18 ENCOUNTER — OFFICE VISIT (OUTPATIENT)
Dept: UROLOGY | Age: 69
End: 2019-06-18

## 2019-06-18 VITALS
WEIGHT: 160.7 LBS | HEIGHT: 67 IN | BODY MASS INDEX: 25.22 KG/M2 | SYSTOLIC BLOOD PRESSURE: 112 MMHG | DIASTOLIC BLOOD PRESSURE: 72 MMHG

## 2019-06-18 DIAGNOSIS — R33.9 RETENTION OF URINE: Primary | ICD-10-CM

## 2019-06-18 LAB — POST VOID RESIDUAL (PVR): 132 ML

## 2019-06-18 PROCEDURE — 51798 US URINE CAPACITY MEASURE: CPT | Performed by: UROLOGY

## 2019-06-18 PROCEDURE — 99212 OFFICE O/P EST SF 10 MIN: CPT | Performed by: UROLOGY

## 2019-06-18 RX ORDER — TAMSULOSIN HYDROCHLORIDE 0.4 MG/1
0.4 CAPSULE ORAL DAILY
Qty: 90 CAPSULE | Refills: 3 | Status: SHIPPED | OUTPATIENT
Start: 2019-06-18

## 2019-06-18 NOTE — PROGRESS NOTES
40-year-old white male returns today for a follow-up. He developed acute urinary tension after total knee arthroplasty. Currently on Flomax, which she is tolerating well and is of benefit. He has nocturia X2. Denies urgency. He has a good urinary flow. He states his quality of life is very good urologically speaking. His PSA is 0.7 NG/mL. Impression: BPH with obstruction, well controlled with alpha-blocker. Plan of care: I gave him a 90-day prescription with 3 refills for tamsulosin. Return in 12 months or as needed.

## 2019-08-20 ENCOUNTER — TELEPHONE (OUTPATIENT)
Dept: UROLOGY | Age: 69
End: 2019-08-20

## (undated) DEVICE — GLOVE SURG SZ 65 THK91MIL LTX FREE SYN POLYISOPRENE

## (undated) DEVICE — SUTURE PERMAHAND SZ 0 L30IN NONABSORBABLE BLK SILK BRAID A306H

## (undated) DEVICE — VORTEX VACUUM MIXING SYSTEM: Brand: VORTEX

## (undated) DEVICE — SUTURE MCRYL SZ 3-0 L27IN ABSRB UD L24MM PS-1 3/8 CIR PRIM Y936H

## (undated) DEVICE — GLOVE SURG SZ 6 THK91MIL LTX FREE SYN POLYISOPRENE ANTI

## (undated) DEVICE — SOLUTION IV 1000ML 0.9% SOD CHL PH 5 INJ USP VIAFLX PLAS

## (undated) DEVICE — SPONGE GZ W4XL4IN COT 12 PLY TYP VII WVN C FLD DSGN

## (undated) DEVICE — SUTURE PERMA-HAND SZ 2-0 L24IN NONABSORBABLE BLK W/O NDL SA75H

## (undated) DEVICE — TETRA-FLEX CF WOVEN LATEX FREE ELASTIC BANDAGE 6" X 5.5 YD: Brand: TETRA-FLEX™CF

## (undated) DEVICE — SUTURE VCRL SZ 3-0 L27IN ABSRB UD FS-2 L19MM 1/2 CIR J423H

## (undated) DEVICE — DRESSING,GAUZE,XEROFORM,CURAD,5"X9",ST: Brand: CURAD

## (undated) DEVICE — GLOVE ORANGE PI 8   MSG9080

## (undated) DEVICE — STRYKER PERFORMANCE SERIES SAGITTAL BLADE: Brand: STRYKER PERFORMANCE SERIES

## (undated) DEVICE — SKIN AFFIX SURG ADHESIVE 72/CS 0.55ML: Brand: MEDLINE

## (undated) DEVICE — SUTURE VCRL SZ 2 L27IN ABSRB VLT L65MM TP-1 1/2 CIR J649G

## (undated) DEVICE — TRAY EPI 25GA L3.5IN 0.75% BIPIVCAIN 8.25% D CONTAIN BPA

## (undated) DEVICE — 4-PORT MANIFOLD: Brand: NEPTUNE 2

## (undated) DEVICE — 35 ML SYRINGE LUER-LOCK TIP: Brand: MONOJECT

## (undated) DEVICE — SUTURE STRATAFIX SPRL SZ 2-0 L9IN ABSRB VLT MH L36MM 1/2 SXPD2B408

## (undated) DEVICE — CHLORAPREP 26ML ORANGE

## (undated) DEVICE — SUTURE NONABSORBABLE MONOFILAMENT 4-0 P-3 18 IN ETHILON 699H

## (undated) DEVICE — SUTURE VIC + LEN CP1 0 70CM VCP267H

## (undated) DEVICE — COVER ARMBRD W13XL28.5IN IMPERV BLU FOR OP RM

## (undated) DEVICE — GLOVE ORANGE PI 7 1/2   MSG9075

## (undated) DEVICE — SPLINT TRAC 12IN UNIV 3 PNL KNEE

## (undated) DEVICE — GOWN,SIRUS,NONRNF,SETINSLV,XL,20/CS: Brand: MEDLINE

## (undated) DEVICE — SPONGE LAP W18XL18IN WHT COT 4 PLY FLD STRUNG RADPQ DISP ST

## (undated) DEVICE — SUTURE PROL SZ 4-0 L18IN NONABSORBABLE BLU L19MM PS-2 3/8 8682G

## (undated) DEVICE — Device

## (undated) DEVICE — SUTURE VCRL SZ 4-0 L27IN ABSRB UD L19MM FS-2 3/8 CIR REV J422H

## (undated) DEVICE — SUTURE ETHBND EXCEL SZ 4-0 L30IN NONABSORBABLE GRN L17MM X871H

## (undated) DEVICE — BANDAGE COMPR E SGL LAYERED CLSR BGE W/ CLP W4INXL15FT

## (undated) DEVICE — SUTURE VCRL + SZ 2-0 L27IN ABSRB UD CP-1 1/2 CIR REV CUT VCP266H

## (undated) DEVICE — BASIC SINGLE BASIN BTC-LF: Brand: MEDLINE INDUSTRIES, INC.

## (undated) DEVICE — PADDING CAST W6INXL4YD COT LO LINTING WYTEX

## (undated) DEVICE — ROYAL SILK SURGICAL GOWN, XXL: Brand: CONVERTORS

## (undated) DEVICE — 3M™ IOBAN™ 2 ANTIMICROBIAL INCISE DRAPE 6651EZ: Brand: IOBAN™ 2

## (undated) DEVICE — GOWN,SIRUS,NON REINFRCD,LARGE,SET IN SL: Brand: MEDLINE

## (undated) DEVICE — PAD,ABDOMINAL,5"X9",ST,LF,25/BX: Brand: MEDLINE INDUSTRIES, INC.

## (undated) DEVICE — SUTURE PROL SZ 3-0 L18IN NONABSORBABLE BLU L30MM FS-1 3/8 8663G

## (undated) DEVICE — PAD THER KNEE FOR PRTBL CLD THER SYS MOB ICE SOFT TEMP

## (undated) DEVICE — SOLUTION IV IRRIG POUR BRL 0.9% SODIUM CHL 2F7124